# Patient Record
Sex: MALE | Race: WHITE | Employment: FULL TIME | URBAN - METROPOLITAN AREA
[De-identification: names, ages, dates, MRNs, and addresses within clinical notes are randomized per-mention and may not be internally consistent; named-entity substitution may affect disease eponyms.]

---

## 2017-07-27 ENCOUNTER — HOSPITAL ENCOUNTER (EMERGENCY)
Age: 52
Discharge: HOME | End: 2017-07-27
Attending: EMERGENCY MEDICINE
Payer: COMMERCIAL

## 2017-07-27 ENCOUNTER — APPOINTMENT (OUTPATIENT)
Dept: CT IMAGING | Age: 52
End: 2017-07-27
Attending: EMERGENCY MEDICINE
Payer: COMMERCIAL

## 2017-07-27 VITALS
TEMPERATURE: 97.8 F | HEIGHT: 73 IN | DIASTOLIC BLOOD PRESSURE: 74 MMHG | SYSTOLIC BLOOD PRESSURE: 136 MMHG | OXYGEN SATURATION: 98 % | BODY MASS INDEX: 30.48 KG/M2 | WEIGHT: 230 LBS | RESPIRATION RATE: 19 BRPM | HEART RATE: 54 BPM

## 2017-07-27 DIAGNOSIS — R42 VERTIGO: Primary | ICD-10-CM

## 2017-07-27 LAB
ALBUMIN SERPL BCP-MCNC: 3.6 G/DL (ref 3.5–5)
ALBUMIN/GLOB SERPL: 1.2 {RATIO} (ref 1.1–2.2)
ALP SERPL-CCNC: 82 U/L (ref 45–117)
ALT SERPL-CCNC: 33 U/L (ref 12–78)
ANION GAP BLD CALC-SCNC: 9 MMOL/L (ref 5–15)
APPEARANCE UR: CLEAR
AST SERPL W P-5'-P-CCNC: 23 U/L (ref 15–37)
ATRIAL RATE: 57 BPM
BASOPHILS # BLD AUTO: 0 K/UL (ref 0–0.1)
BASOPHILS # BLD: 1 % (ref 0–1)
BILIRUB DIRECT SERPL-MCNC: <0.1 MG/DL (ref 0–0.2)
BILIRUB SERPL-MCNC: 0.3 MG/DL (ref 0.2–1)
BILIRUB UR QL: NEGATIVE
BUN SERPL-MCNC: 14 MG/DL (ref 6–20)
BUN/CREAT SERPL: 13 (ref 12–20)
CALCIUM SERPL-MCNC: 8.3 MG/DL (ref 8.5–10.1)
CALCULATED P AXIS, ECG09: 49 DEGREES
CALCULATED R AXIS, ECG10: 59 DEGREES
CALCULATED T AXIS, ECG11: 51 DEGREES
CHLORIDE SERPL-SCNC: 107 MMOL/L (ref 97–108)
CK SERPL-CCNC: 222 U/L (ref 39–308)
CO2 SERPL-SCNC: 26 MMOL/L (ref 21–32)
COLOR UR: NORMAL
CREAT SERPL-MCNC: 1.08 MG/DL (ref 0.7–1.3)
DIAGNOSIS, 93000: NORMAL
EOSINOPHIL # BLD: 0.3 K/UL (ref 0–0.4)
EOSINOPHIL NFR BLD: 5 % (ref 0–7)
ERYTHROCYTE [DISTWIDTH] IN BLOOD BY AUTOMATED COUNT: 13.5 % (ref 11.5–14.5)
GLOBULIN SER CALC-MCNC: 3 G/DL (ref 2–4)
GLUCOSE SERPL-MCNC: 100 MG/DL (ref 65–100)
GLUCOSE UR STRIP.AUTO-MCNC: NEGATIVE MG/DL
HCT VFR BLD AUTO: 43 % (ref 36.6–50.3)
HGB BLD-MCNC: 14.4 G/DL (ref 12.1–17)
HGB UR QL STRIP: NEGATIVE
KETONES UR QL STRIP.AUTO: NEGATIVE MG/DL
LEUKOCYTE ESTERASE UR QL STRIP.AUTO: NEGATIVE
LYMPHOCYTES # BLD AUTO: 39 % (ref 12–49)
LYMPHOCYTES # BLD: 2.1 K/UL (ref 0.8–3.5)
MAGNESIUM SERPL-MCNC: 2 MG/DL (ref 1.6–2.4)
MCH RBC QN AUTO: 29.9 PG (ref 26–34)
MCHC RBC AUTO-ENTMCNC: 33.5 G/DL (ref 30–36.5)
MCV RBC AUTO: 89.2 FL (ref 80–99)
MONOCYTES # BLD: 0.4 K/UL (ref 0–1)
MONOCYTES NFR BLD AUTO: 7 % (ref 5–13)
NEUTS SEG # BLD: 2.7 K/UL (ref 1.8–8)
NEUTS SEG NFR BLD AUTO: 48 % (ref 32–75)
NITRITE UR QL STRIP.AUTO: NEGATIVE
P-R INTERVAL, ECG05: 172 MS
PH UR STRIP: 6.5 [PH] (ref 5–8)
PLATELET # BLD AUTO: 199 K/UL (ref 150–400)
POTASSIUM SERPL-SCNC: 3.6 MMOL/L (ref 3.5–5.1)
PROT SERPL-MCNC: 6.6 G/DL (ref 6.4–8.2)
PROT UR STRIP-MCNC: NEGATIVE MG/DL
Q-T INTERVAL, ECG07: 426 MS
QRS DURATION, ECG06: 98 MS
QTC CALCULATION (BEZET), ECG08: 414 MS
RBC # BLD AUTO: 4.82 M/UL (ref 4.1–5.7)
SODIUM SERPL-SCNC: 142 MMOL/L (ref 136–145)
SP GR UR REFRACTOMETRY: 1.02 (ref 1–1.03)
TROPONIN I SERPL-MCNC: <0.04 NG/ML
UROBILINOGEN UR QL STRIP.AUTO: 0.2 EU/DL (ref 0.2–1)
VENTRICULAR RATE, ECG03: 57 BPM
WBC # BLD AUTO: 5.6 K/UL (ref 4.1–11.1)

## 2017-07-27 PROCEDURE — 36415 COLL VENOUS BLD VENIPUNCTURE: CPT | Performed by: EMERGENCY MEDICINE

## 2017-07-27 PROCEDURE — 96374 THER/PROPH/DIAG INJ IV PUSH: CPT

## 2017-07-27 PROCEDURE — 83735 ASSAY OF MAGNESIUM: CPT | Performed by: EMERGENCY MEDICINE

## 2017-07-27 PROCEDURE — 96361 HYDRATE IV INFUSION ADD-ON: CPT

## 2017-07-27 PROCEDURE — 93005 ELECTROCARDIOGRAM TRACING: CPT

## 2017-07-27 PROCEDURE — 84484 ASSAY OF TROPONIN QUANT: CPT | Performed by: EMERGENCY MEDICINE

## 2017-07-27 PROCEDURE — 82550 ASSAY OF CK (CPK): CPT | Performed by: EMERGENCY MEDICINE

## 2017-07-27 PROCEDURE — 94762 N-INVAS EAR/PLS OXIMTRY CONT: CPT

## 2017-07-27 PROCEDURE — 80076 HEPATIC FUNCTION PANEL: CPT | Performed by: EMERGENCY MEDICINE

## 2017-07-27 PROCEDURE — 99285 EMERGENCY DEPT VISIT HI MDM: CPT

## 2017-07-27 PROCEDURE — 81003 URINALYSIS AUTO W/O SCOPE: CPT | Performed by: EMERGENCY MEDICINE

## 2017-07-27 PROCEDURE — 74011250636 HC RX REV CODE- 250/636: Performed by: EMERGENCY MEDICINE

## 2017-07-27 PROCEDURE — 70450 CT HEAD/BRAIN W/O DYE: CPT

## 2017-07-27 PROCEDURE — 80048 BASIC METABOLIC PNL TOTAL CA: CPT | Performed by: EMERGENCY MEDICINE

## 2017-07-27 PROCEDURE — 85025 COMPLETE CBC W/AUTO DIFF WBC: CPT | Performed by: EMERGENCY MEDICINE

## 2017-07-27 RX ORDER — MECLIZINE HYDROCHLORIDE 25 MG/1
25 TABLET ORAL
Qty: 20 TAB | Refills: 0 | Status: SHIPPED | OUTPATIENT
Start: 2017-07-27 | End: 2020-07-21 | Stop reason: SDUPTHER

## 2017-07-27 RX ORDER — ONDANSETRON 2 MG/ML
4 INJECTION INTRAMUSCULAR; INTRAVENOUS
Status: COMPLETED | OUTPATIENT
Start: 2017-07-27 | End: 2017-07-27

## 2017-07-27 RX ORDER — MECLIZINE HYDROCHLORIDE 25 MG/1
25 TABLET ORAL
Status: COMPLETED | OUTPATIENT
Start: 2017-07-27 | End: 2017-07-27

## 2017-07-27 RX ADMIN — SODIUM CHLORIDE 1000 ML: 900 INJECTION, SOLUTION INTRAVENOUS at 15:06

## 2017-07-27 RX ADMIN — ONDANSETRON 4 MG: 2 INJECTION INTRAMUSCULAR; INTRAVENOUS at 15:06

## 2017-07-27 RX ADMIN — MECLIZINE HYDROCHLORIDE 25 MG: 25 TABLET ORAL at 15:06

## 2017-07-27 NOTE — ED TRIAGE NOTES
Pt c/o dizziness x 1 month with fatigue. Pt verbalizes that it is worsening. Denies pain. Denies SOB or fevers. +nausea.

## 2017-07-27 NOTE — ED PROVIDER NOTES
HPI Comments: 49 y.o. male with past medical history significant for HTN and PVCs who presents from home with chief complaint of dizziness. Pt complains of dizziness with associated fatigue and nausea intermittently for the past few weeks with worsening sxs today, prompting pt to come to [facility] for further evaluation. Pt complains of dizziness worse when he moves his head laterally. Pt describes feeling \"whoozy\" and nauseated sometimes but denies vomiting or diarrhea. 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 Pt reports adequate hydration. Pt denies any regular medications. There are no other acute medical concerns at this time. Social hx: No tobacco use; Occasional EtOH. PCP: Colby Mckeon MD    Note written by Tania Faria, as dictated by Marielena De MD 2:40 PM      The history is provided by the patient. No  was used. Past Medical History:   Diagnosis Date    Chest pain, unspecified 6/17/2012    Hypertension     PVC's (premature ventricular contractions) 6/17/2012       Past Surgical History:   Procedure Laterality Date    HX ORTHOPAEDIC           No family history on file. Social History     Social History    Marital status:      Spouse name: N/A    Number of children: N/A    Years of education: N/A     Occupational History    Not on file. Social History Main Topics    Smoking status: Never Smoker    Smokeless tobacco: Never Used    Alcohol use Yes      Comment: Occasionally    Drug use: Not on file    Sexual activity: Yes     Other Topics Concern    Not on file     Social History Narrative         ALLERGIES: Doxycycline    Review of Systems   Constitutional: Positive for fatigue. Negative for activity change and fever. Eyes: Negative for pain. Respiratory: Negative for cough and shortness of breath.     Cardiovascular: Negative for chest pain and leg swelling. Gastrointestinal: Positive for nausea. Negative for abdominal pain, diarrhea and vomiting. Genitourinary: Negative for flank pain and hematuria. Musculoskeletal: Negative for gait problem, neck pain and neck stiffness. Skin: Negative for color change. Neurological: Positive for dizziness. Negative for speech difficulty and headaches. Hematological: Does not bruise/bleed easily. Psychiatric/Behavioral: Negative for confusion. All other systems reviewed and are negative. Vitals:    07/27/17 1341   BP: 149/85   Pulse: 62   Resp: 16   Temp: 97.8 °F (36.6 °C)   SpO2: 97%   Weight: 104.3 kg (230 lb)   Height: 6' 1\" (1.854 m)            Physical Exam   Constitutional: He is oriented to person, place, and time. He appears well-developed and well-nourished. No distress. HENT:   Head: Normocephalic and atraumatic. Right Ear: External ear normal.   Left Ear: External ear normal.   Eyes: EOM are normal. Pupils are equal, round, and reactive to light. Neck: Normal range of motion. Neck supple. No JVD present. No tracheal deviation present. Cardiovascular: Normal rate, regular rhythm and normal heart sounds. Exam reveals no gallop and no friction rub. No murmur heard. Pulmonary/Chest: Effort normal and breath sounds normal. No stridor. No respiratory distress. He has no wheezes. He has no rales. Abdominal: Soft. Bowel sounds are normal. He exhibits no distension. There is no tenderness. There is no rebound and no guarding. Musculoskeletal: Normal range of motion. He exhibits no edema or tenderness. Neurological: He is alert and oriented to person, place, and time. He has normal reflexes. No cranial nerve deficit. Coordination normal.   He has normal sensation in face and all extremities. He has no facial droop. 5/5 hand  strength, biceps and triceps strength bilaterally. 5/5 plantar/dorsiflexion and extension strength bilaterally. Skin: Skin is warm and dry.  No rash noted. He is not diaphoretic. No erythema. Psychiatric: He has a normal mood and affect. His behavior is normal. Judgment and thought content normal.   Nursing note and vitals reviewed. Note written by Ana Hill, as dictated by Talisha Constantino MD 3:26 PM        MDM  Number of Diagnoses or Management Options  Diagnosis management comments: 60-year-old white male presents to the emergency department with dizziness and vertigo. Patient has been working and training outside. Patient to be dehydrated versus peripheral vertigo. We'll also check a head CT, blood work, urinalysis. We'll give him meclizine and IV fluids and Zofran and reassess. Patient agrees with this plan       Amount and/or Complexity of Data Reviewed  Clinical lab tests: ordered and reviewed  Tests in the radiology section of CPT®: ordered and reviewed  Tests in the medicine section of CPT®: ordered and reviewed  Decide to obtain previous medical records or to obtain history from someone other than the patient: yes  Obtain history from someone other than the patient: yes  Review and summarize past medical records: yes  Independent visualization of images, tracings, or specimens: yes    Risk of Complications, Morbidity, and/or Mortality  Presenting problems: moderate  Diagnostic procedures: moderate  Management options: moderate    Patient Progress  Patient progress: improved    ED Course       Procedures  PROGRESS NOTE:  4:19 PM  Head CT, blood work, and UA are all normal. Will re-evaluate pt and see how he is feeling. Pt is improve. He'd like to go home. Will add Meclizine. Good return precautions given to patient. Close follow up with PCP recommended. Patient and/or family voices understanding of this plan. Discharge instructions were explained by me and all concerns were addressed.

## 2017-07-27 NOTE — ED NOTES
Patient c/o dizziness x1 month and today was worse. Today he works on heavy [employer] in the heat. Denies nausea and vomiting.

## 2017-07-27 NOTE — DISCHARGE INSTRUCTIONS
We hope that we have addressed all of your medical concerns. The examination and treatment you received in the Emergency Department were for an emergent problem and were not intended as complete care. It is important that you follow up with your healthcare provider(s) for ongoing care. If your symptoms worsen or do not improve as expected, and you are unable to reach your usual health care provider(s), you should return to the Emergency Department. Today's healthcare is undergoing tremendous change, and patient satisfaction surveys are one of the many tools to assess the quality of medical care. You may receive a survey from the [employer] regarding your experience in the Emergency Department. I hope that your experience has been completely positive, particularly the medical care that I provided. As such, please participate in the survey; anything less than excellent does not meet my expectations or intentions. [facility] and [facility] participate in nationally recognized quality of care measures. If your blood pressure is greater than 120/80, as reported below, we urge that you seek medical care to address the potential of high blood pressure, commonly known as hypertension. Hypertension can be hereditary or can be caused by certain medical conditions, pain, stress, or \"white coat syndrome. \"       Please make an appointment with your health care provider(s) for follow up of your Emergency Department visit. VITALS:   Patient Vitals for the past 8 hrs:   Temp Pulse Resp BP SpO2   07/27/17 1630 - (!) 49 18 136/74 96 %   07/27/17 1615 - (!) 49 19 141/85 99 %   07/27/17 1600 - (!) 51 19 146/87 97 %   07/27/17 1545 - (!) 53 13 148/84 97 %   07/27/17 1530 - (!) 51 17 140/80 -   07/27/17 1515 - (!) 53 20 139/81 -   07/27/17 1341 97.8 °F (36.6 °C) 62 16 149/85 97 %          Thank you for allowing us to provide you with medical care today. We realize that you have many choices for your emergency care needs. Please choose us in the future for any continued health care needs. Mitchel Travis MD    [address] [facility].   Office: [phone]            Recent Results (from the past 24 hour(s))   CBC WITH AUTOMATED DIFF    Collection Time: 07/27/17  2:00 PM   Result Value Ref Range    WBC 5.6 4.1 - 11.1 K/uL    RBC 4.82 4.10 - 5.70 M/uL    HGB 14.4 12.1 - 17.0 g/dL    HCT 43.0 36.6 - 50.3 %    MCV 89.2 80.0 - 99.0 FL    MCH 29.9 26.0 - 34.0 PG    MCHC 33.5 30.0 - 36.5 g/dL    RDW 13.5 11.5 - 14.5 %    PLATELET 161 224 - 728 K/uL    NEUTROPHILS 48 32 - 75 %    LYMPHOCYTES 39 12 - 49 %    MONOCYTES 7 5 - 13 %    EOSINOPHILS 5 0 - 7 %    BASOPHILS 1 0 - 1 %    ABS. NEUTROPHILS 2.7 1.8 - 8.0 K/UL    ABS. LYMPHOCYTES 2.1 0.8 - 3.5 K/UL    ABS. MONOCYTES 0.4 0.0 - 1.0 K/UL    ABS. EOSINOPHILS 0.3 0.0 - 0.4 K/UL    ABS.  BASOPHILS 0.0 0.0 - 0.1 K/UL   METABOLIC PANEL, BASIC    Collection Time: 07/27/17  2:00 PM   Result Value Ref Range    Sodium 142 136 - 145 mmol/L    Potassium 3.6 3.5 - 5.1 mmol/L    Chloride 107 97 - 108 mmol/L    CO2 26 21 - 32 mmol/L    Anion gap 9 5 - 15 mmol/L    Glucose 100 65 - 100 mg/dL    BUN 14 6 - 20 MG/DL    Creatinine 1.08 0.70 - 1.30 MG/DL    BUN/Creatinine ratio 13 12 - 20      GFR est AA >60 >60 ml/min/1.73m2    GFR est non-AA >60 >60 ml/min/1.73m2    Calcium 8.3 (L) 8.5 - 10.1 MG/DL   CK W/ REFLX CKMB    Collection Time: 07/27/17  2:00 PM   Result Value Ref Range     39 - 308 U/L   TROPONIN I    Collection Time: 07/27/17  2:00 PM   Result Value Ref Range    Troponin-I, Qt. <0.04 <0.05 ng/mL   MAGNESIUM    Collection Time: 07/27/17  2:00 PM   Result Value Ref Range    Magnesium 2.0 1.6 - 2.4 mg/dL   HEPATIC FUNCTION PANEL    Collection Time: 07/27/17  2:00 PM   Result Value Ref Range    Protein, total 6.6 6.4 - 8.2 g/dL    Albumin 3.6 3.5 - 5.0 g/dL    Globulin 3.0 2.0 - 4.0 g/dL A-G Ratio 1.2 1.1 - 2.2      Bilirubin, total 0.3 0.2 - 1.0 MG/DL    Bilirubin, direct <0.1 0.0 - 0.2 MG/DL    Alk. phosphatase 82 45 - 117 U/L    AST (SGOT) 23 15 - 37 U/L    ALT (SGPT) 33 12 - 78 U/L   URINALYSIS W/ RFLX MICROSCOPIC    Collection Time: 07/27/17  3:08 PM   Result Value Ref Range    Color YELLOW/STRAW      Appearance CLEAR CLEAR      Specific gravity 1.022 1.003 - 1.030      pH (UA) 6.5 5.0 - 8.0      Protein NEGATIVE  NEG mg/dL    Glucose NEGATIVE  NEG mg/dL    Ketone NEGATIVE  NEG mg/dL    Bilirubin NEGATIVE  NEG      Blood NEGATIVE  NEG      Urobilinogen 0.2 0.2 - 1.0 EU/dL    Nitrites NEGATIVE  NEG      Leukocyte Esterase NEGATIVE  NEG         Ct Head Wo Cont    Result Date: 7/27/2017  INDICATION: Vertigo, episodic, peripheral; vertigo, dizziness, not feeling well, eval for intracranial process. Dizziness for one month. Exam: Noncontrast CT of the brain is performed with 5 mm collimation. CT dose reduction was achieved with the use of the standardized protocol tailored for this examination and automatic exposure control for dose modulation. FINDINGS: There is no acute intracranial hemorrhage, mass, mass effect or herniation. Ventricular system is normal. The gray-white matter differentiation is well-preserved. The mastoid air cells are well pneumatized. The visualized paranasal sinuses are normal. IMPRESSION: No acute intracranial hemorrhage, mass or infarct. Dizziness: Care Instructions  Your Care Instructions  Dizziness is the feeling of unsteadiness or fuzziness in your head. It is different than having vertigo, which is a feeling that the room is spinning or that you are moving or falling. It is also different from lightheadedness, which is the feeling that you are about to faint. It can be hard to know what causes dizziness. Some people feel dizzy when they have migraine headaches. Sometimes bouts of flu can make you feel dizzy.  Some medical conditions, such as heart problems or high blood pressure, can make you feel dizzy. Many medicines can cause dizziness, including medicines for high blood pressure, pain, or anxiety. If a medicine causes your symptoms, your doctor may recommend that you stop or change the medicine. If it is a problem with your heart, you may need medicine to help your heart work better. If there is no clear reason for your symptoms, your doctor may suggest watching and waiting for a while to see if the dizziness goes away on its own. Follow-up care is a key part of your treatment and safety. Be sure to make and go to all appointments, and call your doctor if you are having problems. It's also a good idea to know your test results and keep a list of the medicines you take. How can you care for yourself at home? · If your doctor recommends or prescribes medicine, take it exactly as directed. Call your doctor if you think you are having a problem with your medicine. · Do not drive while you feel dizzy. · Try to prevent falls. Steps you can take include:  ¨ Using nonskid mats, adding grab bars near the tub, and using night-lights. ¨ Clearing your home so that walkways are free of anything you might trip on. ¨ Letting family and friends know that you have been feeling dizzy. This will help them know how to help you. When should you call for help? Call 911 anytime you think you may need emergency care. For example, call if:  · You passed out (lost consciousness). · You have dizziness along with symptoms of a heart attack. These may include:  ¨ Chest pain or pressure, or a strange feeling in the chest.  ¨ Sweating. ¨ Shortness of breath. ¨ Nausea or vomiting. ¨ Pain, pressure, or a strange feeling in the back, neck, jaw, or upper belly or in one or both shoulders or arms. ¨ Lightheadedness or sudden weakness. ¨ A fast or irregular heartbeat. · You have symptoms of a stroke.  These may include:  ¨ Sudden numbness, tingling, weakness, or loss of movement in your face, arm, or leg, especially on only one side of your body. ¨ Sudden vision changes. ¨ Sudden trouble speaking. ¨ Sudden confusion or trouble understanding simple statements. ¨ Sudden problems with walking or balance. ¨ A sudden, severe headache that is different from past headaches. Call your doctor now or seek immediate medical care if:  · You feel dizzy and have a fever, headache, or ringing in your ears. · You have new or increased nausea and vomiting. · Your dizziness does not go away or comes back. Watch closely for changes in your health, and be sure to contact your doctor if:  · You do not get better as expected. Where can you learn more? Go to http://ellaDynamicOpskristian.info/. Enter R198 in the search box to learn more about \"Dizziness: Care Instructions. \"  Current as of: March 20, 2017  Content Version: 11.3  © 4670-9601 [employer]. Care instructions adapted under license by [employer] (which disclaims liability or warranty for this information). If you have questions about a medical condition or this instruction, always ask your healthcare professional. [place] [address] any warranty or liability for your use of this information. Vertigo: Care Instructions  Your Care Instructions  Vertigo is the feeling that you or your surroundings are moving when there is no actual movement. It is often described as a feeling of spinning, whirling, falling, or tilting. Vertigo may make you vomit or feel nauseated. You may have trouble standing or walking and may lose your balance. Vertigo is often related to an inner ear problem, but it can have other more serious causes. If vertigo continues, you may need more tests to find its cause. Follow-up care is a key part of your treatment and safety. Be sure to make and go to all appointments, and call your doctor if you are having problems.  Its also a good idea to know your test results and keep a list of the medicines you take. How can you care for yourself at home? · Do not lie flat on your back. Prop yourself up slightly. This may reduce the spinning feeling. Keep your eyes open. · Move slowly so that you do not fall. · If your doctor recommends medicine, take it exactly as directed. · Do not drive while you are having vertigo. Certain exercises, called Nettles-Daroff exercises, can help decrease vertigo. To do Nettles-Daroff exercises:  · Sit on the edge of a bed or sofa and quickly lie down on the side that causes the worst vertigo. Lie on your side with your ear down. · Stay in this position for at least 30 seconds or until the vertigo goes away. · Sit up. If this causes vertigo, wait for it to stop. · Repeat the procedure on the other side. · Repeat this 10 times. Do these exercises 2 times a day until the vertigo is gone. When should you call for help? Call 911 anytime you think you may need emergency care. For example, call if:  · You passed out (lost consciousness). · You have symptoms of a stroke. These may include:  ¨ Sudden numbness, tingling, weakness, or loss of movement in your face, arm, or leg, especially on only one side of your body. ¨ Sudden vision changes. ¨ Sudden trouble speaking. ¨ Sudden confusion or trouble understanding simple statements. ¨ Sudden problems with walking or balance. ¨ A sudden, severe headache that is different from past headaches. Call your doctor now or seek immediate medical care if:  · Vertigo occurs with a fever, a headache, or ringing in your ears. · You have new or increased nausea and vomiting. Watch closely for changes in your health, and be sure to contact your doctor if:  · Vertigo gets worse or happens more often. · Vertigo has not gotten better after 2 weeks. Where can you learn more? Go to http://ella-kristian.info/.   Enter M629 in the search box to learn more about \"Vertigo: Care Instructions. \"  Current as of: July 29, 2016  Content Version: 11.3  © 5857-0260 [employer], [facility]. Care instructions adapted under license by [employer] (which disclaims liability or warranty for this information). If you have questions about a medical condition or this instruction, always ask your healthcare professional. [place] [address] any warranty or liability for your use of this information.

## 2017-08-08 ENCOUNTER — OFFICE VISIT (OUTPATIENT)
Dept: FAMILY MEDICINE CLINIC | Age: 52
End: 2017-08-08

## 2017-08-08 VITALS
DIASTOLIC BLOOD PRESSURE: 82 MMHG | SYSTOLIC BLOOD PRESSURE: 127 MMHG | TEMPERATURE: 98.2 F | RESPIRATION RATE: 16 BRPM | HEIGHT: 73 IN | OXYGEN SATURATION: 96 % | WEIGHT: 234 LBS | HEART RATE: 86 BPM | BODY MASS INDEX: 31.01 KG/M2

## 2017-08-08 DIAGNOSIS — M79.671 PAIN OF RIGHT HEEL: ICD-10-CM

## 2017-08-08 DIAGNOSIS — R42 DIZZINESS: Primary | ICD-10-CM

## 2017-08-08 DIAGNOSIS — R53.83 FATIGUE, UNSPECIFIED TYPE: ICD-10-CM

## 2017-08-08 RX ORDER — NAPROXEN 500 MG/1
500 TABLET ORAL 2 TIMES DAILY WITH MEALS
Qty: 28 TAB | Refills: 0 | Status: SHIPPED | OUTPATIENT
Start: 2017-08-08 | End: 2017-08-22

## 2017-08-08 NOTE — PROGRESS NOTES
Reviewed record in preparation for visit and have necessary documentation  Opportunity was given for questions  Goals that were addressed and/or need to be completed after this appointment include   Health Maintenance Due   Topic Date Due    DTaP/Tdap/Td series (1 - Tdap) 08/16/1987    FOBT Q 1 YEAR AGE 50-75  08/16/2016    INFLUENZA AGE 9 TO ADULT  08/01/2017     Orthostatic BP  Laying 144/79 pulse 75  Sitting 135/82 pulse 69  Standing 127/82 pulse 86

## 2017-08-08 NOTE — PATIENT INSTRUCTIONS
Plantar Fasciitis: Exercises  Your Care Instructions  Here are some examples of typical rehabilitation exercises for your condition. Start each exercise slowly. Ease off the exercise if you start to have pain. Your doctor or physical therapist will tell you when you can start these exercises and which ones will work best for you. How to do the exercises  Note: Each exercise should create a pulling feeling but should not cause pain. Towel stretch    1. Sit with your legs extended and knees straight. 2. Place a towel around your foot just under the toes. 3. Hold each end of the towel in each hand, with your hands above your knees. 4. Pull back with the towel so that your foot stretches toward you. 5. Hold the position for at least 15 to 30 seconds. 6. Repeat 2 to 4 times a session, up to 5 sessions a day. Calf stretch    Note: This exercise stretches the muscles at the back of the lower leg (the calf) and the Achilles tendon. Do this exercise 3 or 4 times a day, 5 days a week. 1. Stand facing a wall with your hands on the wall at about eye level. Put the leg you want to stretch about a step behind your other leg. 2. Keeping your back heel on the floor, bend your front knee until you feel a stretch in the back leg. 3. Hold the stretch for 15 to 30 seconds. Repeat 2 to 4 times. Plantar fascia and calf stretch    Note: Stretching the plantar fascia and calf muscles can increase flexibility and decrease heel pain. You can do this exercise several times each day and before and after activity. 1. Stand on a step as shown above. Be sure to hold on to the banister. 2. Slowly let your heels down over the edge of the step as you relax your calf muscles. You should feel a gentle stretch across the bottom of your foot and up the back of your leg to your knee. 3. Hold the stretch about 15 to 30 seconds, and then tighten your calf muscle a little to bring your heel back up to the level of the step.  Repeat 2 to 4 times. Towel curls    1. While sitting, place your foot on a towel on the floor and scrunch the towel toward you with your toes. 2. Then, also using your toes, push the towel away from you. Note: Make this exercise more challenging by placing a weighted object, such as a soup can, on the other end of the towel. [place] pickups    1. Put marbles on the floor next to a cup.  2. Using your toes, try to lift the marbles up from the floor and put them in the cup. Follow-up care is a key part of your treatment and safety. Be sure to make and go to all appointments, and call your doctor if you are having problems. It's also a good idea to know your test results and keep a list of the medicines you take. Where can you learn more? Go to http://ella-kristian.info/. Harjit Miles in the search box to learn more about \"Plantar Fasciitis: Exercises. \"  Current as of: March 21, 2017  Content Version: 11.3  © 9220-8428 [employer]. Care instructions adapted under license by [employer] (which disclaims liability or warranty for this information). If you have questions about a medical condition or this instruction, always ask your healthcare professional. [address] any warranty or liability for your use of this information.

## 2017-08-08 NOTE — MR AVS SNAPSHOT
Visit Information Date & Time Provider Department Dept. Phone Encounter #  
 8/8/2017 10:30 AM Liam Tang MD Danilo White [id number] Follow-up Instructions Return in about 4 weeks (around 9/5/2017), or if symptoms worsen or fail to improve. Upcoming Health Maintenance Date Due DTaP/Tdap/Td series (1 - Tdap) 8/16/1987 FOBT Q 1 YEAR AGE 50-75 8/16/2016 INFLUENZA AGE 9 TO ADULT 8/1/2017 Allergies as of 8/8/2017  Review Complete On: 8/8/2017 By: Liam Tang MD  
  
 Severity Noted Reaction Type Reactions Doxycycline Medium 07/15/2011    Hives Current Immunizations  Reviewed on 10/5/2011 No immunizations on file. Not reviewed this visit You Were Diagnosed With   
  
 Codes Comments Dizziness    -  Primary ICD-10-CM: S49 ICD-9-CM: 780.4 Fatigue, unspecified type     ICD-10-CM: R53.83 ICD-9-CM: 780.79 Pain of left heel     ICD-10-CM: Q46.733 ICD-9-CM: 729.5 Vitals BP Pulse Temp Resp Height(growth percentile) Weight(growth percentile) 127/82 (BP 1 Location: Left arm, BP Patient Position: Standing) 86 98.2 °F (36.8 °C) (Oral) 16 6' 1\" (1.854 m) 234 lb (106.1 kg) SpO2 BMI Smoking Status 96% 30.87 kg/m2 Never Smoker Vitals History BMI and BSA Data Body Mass Index Body Surface Area  
 30.87 kg/m 2 2.34 m 2 Preferred Pharmacy Pharmacy Name Phone [address] [place], [place] - [address] [phone] Your Updated Medication List  
  
   
This list is accurate as of: 8/8/17 10:45 AM.  Always use your most recent med list.  
  
  
  
  
 lactulose 10 gram/15 mL solution Commonly known as:  Adron Query Take 30 mL by mouth three (3) times daily. meclizine 25 mg tablet Commonly known as:  ANTIVERT Take 1 Tab by mouth three (3) times daily as needed. naproxen 500 mg tablet Commonly known as:  NAPROSYN Take 1 Tab by mouth two (2) times daily (with meals) for 14 days. promethazine-codeine 6.25-10 mg/5 mL syrup Commonly known as:  PHENERGAN with CODEINE Take 5 mL by mouth four (4) times daily as needed for Cough. Max Daily Amount: 20 mL. Prescriptions Sent to Pharmacy Refills  
 naproxen (NAPROSYN) 500 mg tablet 0 Sig: Take 1 Tab by mouth two (2) times daily (with meals) for 14 days. Class: Normal  
 Pharmacy: [facility] #: [phone] Route: Oral  
  
We Performed the Following HGB & HCT [20934 CPT(R)] METABOLIC PANEL, COMPREHENSIVE [22444 CPT(R)] TSH 3RD GENERATION [30346 CPT(R)] VITAMIN D, 25 HYDROXY [id number] CPT(R)] Follow-up Instructions Return in about 4 weeks (around 9/5/2017), or if symptoms worsen or fail to improve. Patient Instructions Plantar Fasciitis: Exercises Your Care Instructions Here are some examples of typical rehabilitation exercises for your condition. Start each exercise slowly. Ease off the exercise if you start to have pain. Your doctor or physical therapist will tell you when you can start these exercises and which ones will work best for you. How to do the exercises Note: Each exercise should create a pulling feeling but should not cause pain. Towel stretch 1. Sit with your legs extended and knees straight. 2. Place a towel around your foot just under the toes. 3. Hold each end of the towel in each hand, with your hands above your knees. 4. Pull back with the towel so that your foot stretches toward you. 5. Hold the position for at least 15 to 30 seconds. 6. Repeat 2 to 4 times a session, up to 5 sessions a day. Calf stretch Note: This exercise stretches the muscles at the back of the lower leg (the calf) and the Achilles tendon. Do this exercise 3 or 4 times a day, 5 days a week. 1. Stand facing a wall with your hands on the wall at about eye level. Put the leg you want to stretch about a step behind your other leg. 2. Keeping your back heel on the floor, bend your front knee until you feel a stretch in the back leg. 3. Hold the stretch for 15 to 30 seconds. Repeat 2 to 4 times. Plantar fascia and calf stretch Note: Stretching the plantar fascia and calf muscles can increase flexibility and decrease heel pain. You can do this exercise several times each day and before and after activity. 1. Stand on a step as shown above. Be sure to hold on to the banister. 2. Slowly let your heels down over the edge of the step as you relax your calf muscles. You should feel a gentle stretch across the bottom of your foot and up the back of your leg to your knee. 3. Hold the stretch about 15 to 30 seconds, and then tighten your calf muscle a little to bring your heel back up to the level of the step. Repeat 2 to 4 times. Towel curls 1. While sitting, place your foot on a towel on the floor and scrunch the towel toward you with your toes. 2. Then, also using your toes, push the towel away from you. Note: Make this exercise more challenging by placing a weighted object, such as a soup can, on the other end of the towel. [place] pickups 1. Put marbles on the floor next to a cup. 
2. Using your toes, try to lift the marbles up from the floor and put them in the cup. Follow-up care is a key part of your treatment and safety. Be sure to make and go to all appointments, and call your doctor if you are having problems. It's also a good idea to know your test results and keep a list of the medicines you take. Where can you learn more? Go to http://ella-kristian.info/. Karen Lacy in the search box to learn more about \"Plantar Fasciitis: Exercises. \" Current as of: March 21, 2017 Content Version: 11.3 © 8893-9419 [employer].  Care instructions adapted under license by [address] (which disclaims liability or warranty for this information). If you have questions about a medical condition or this instruction, always ask your healthcare professional. Nandini Carter any warranty or liability for your use of this information. Introducing [facility]! Yamila Santizo introduces [employer] patient portal. Now you can access parts of your medical record, email your doctor's office, and request medication refills online. 1. In your internet browser, go to [email or web address] 2. Click on the First Time User? Click Here link in the Sign In box. You will see the New Member Sign Up page. 3. Enter your [employer] Access Code exactly as it appears below. You will not need to use this code after youve completed the sign-up process. If you do not sign up before the expiration date, you must request a new code. · [employer] Access Code: [email or web address] Expires: 10/25/2017  4:41 PM 
 
4. Enter the last four digits of your Social Security Number (xxxx) and Date of Birth (mm/dd/yyyy) as indicated and click Submit. You will be taken to the next sign-up page. 5. Create a H[employer]ters ID. This will be your [employer] login ID and cannot be changed, so think of one that is secure and easy to remember. 6. Create a [employer] password. You can change your password at any time. 7. Enter your Password Reset Question and Answer. This can be used at a later time if you forget your password. 8. Enter your e-mail address. You will receive e-mail notification when new information is available in [address] 9. Click Sign Up. You can now view and download portions of your medical record. 10. Click the Download Summary menu link to download a portable copy of your medical information. If you have questions, please visit the Frequently Asked Questions section of the [employer] website.  Remember, [employer] is NOT to be used for urgent needs. For medical emergencies, dial 911. Now available from your iPhone and Android! Please provide this summary of care documentation to your next provider. Your primary care clinician is listed as Phys Other. If you have any questions after today's visit, please call [phone].

## 2017-08-08 NOTE — PROGRESS NOTES
Progress Note    Patient: Mercy Loomis MRN: [id number]  SSN: [id number]    YOB: 1965  Age: 49 y.o. Sex: male        Chief Complaint   Patient presents with    Dizziness    Foot Pain     heel pain         Subjective:     Encounter Diagnoses   Name Primary?  Dizziness Yes    Fatigue, unspecified type     Pain of right heel        Dizziness  I personally reviewed and summarized the following from an [facility] ER visit on 7/27/17:  Patient present to ER complaining of dizziness x 1 month, intermittently. Associated with fatigue. Endorses significant time working in heat though the patient reports adequate hydration. CT Head w/out contrast showed no acute intracranial process. He was discharged with meclizine. He reports that meclizine is not helping with dizziness. Describes dizziness as lightheadedness \"almost off-kilter. \" worsens with movement, moving from sitting to standing or turning in bed. No head trauma. Endorses tinnitus intermittent not associated with dizziness. Patient also endorses fatigue and difficulty starting activity. Has noted that he has been working on weight loss but as not been successful despite diet and exercise. Heel pain  Left heel pain x 2 months. Worsening gradually. Pain is worse first thing in the morning upon walking. Has tried several OTC inserts. Current and past medical information:    Current Medications after this visit[de-identified]     Current Outpatient Prescriptions   Medication Sig    naproxen (NAPROSYN) 500 mg tablet Take 1 Tab by mouth two (2) times daily (with meals) for 14 days.  meclizine (ANTIVERT) 25 mg tablet Take 1 Tab by mouth three (3) times daily as needed.  lactulose (CHRONULAC) 10 gram/15 mL solution Take 30 mL by mouth three (3) times daily.  promethazine-codeine (PHENERGAN WITH CODEINE) 6.25-10 mg/5 mL syrup Take 5 mL by mouth four (4) times daily as needed for Cough. Max Daily Amount: 20 mL.      No current facility-administered medications for this visit. Patient Active Problem List    Diagnosis Date Noted    Chest pain, unspecified 06/17/2012    PVC's (premature ventricular contractions) 06/17/2012    HTN (hypertension) 04/12/2012    Poor eyesight 01/06/2012    Epididymitis 06/08/2011    Epididymitis 06/08/2011    Bruising 05/12/2011    Eczema 01/27/2011    Cough 01/07/2011    Congestion 01/07/2011       Past Medical History:   Diagnosis Date    Chest pain, unspecified 6/17/2012    Hypertension     PVC's (premature ventricular contractions) 6/17/2012       Allergies   Allergen Reactions    Doxycycline Hives       Past Surgical History:   Procedure Laterality Date    HX ORTHOPAEDIC         Social History     Social History    Marital status:      Spouse name: N/A    Number of children: N/A    Years of education: N/A     Social History Main Topics    Smoking status: Never Smoker    Smokeless tobacco: Never Used    Alcohol use Yes      Comment: Occasionally    Drug use: None    Sexual activity: Yes     Other Topics Concern    None     Social History Narrative       {Review of Systems   Constitutional: Positive for malaise/fatigue. Negative for chills, diaphoresis, fever and weight loss. Respiratory: Negative for cough. Cardiovascular: Negative for leg swelling. Musculoskeletal: Positive for myalgias. Negative for falls and joint pain. Skin: Negative for itching and rash. Neurological: Positive for dizziness. Negative for tingling, tremors, focal weakness, seizures and weakness. Psychiatric/Behavioral: Negative for depression. The patient does not have insomnia. Objective:     Vitals:    08/08/17 1015 08/08/17 1018 08/08/17 1020   BP: 144/79 135/82 127/82   Pulse: 75 69 86   Resp: 16     Temp: 98.2 °F (36.8 °C)     TempSrc: Oral     SpO2: 96%     Weight: 234 lb (106.1 kg)     Height: 6' 1\" (1.854 m)        Body mass index is 30.87 kg/(m^2).       Physical Exam Constitutional: He is oriented to person, place, and time. He appears well-developed and well-nourished. He appears distressed. HENT:   Head: Normocephalic and atraumatic. Right Ear: External ear normal.   Left Ear: External ear normal.   Mouth/Throat: Oropharynx is clear and moist. No oropharyngeal exudate. Eyes: Conjunctivae and EOM are normal. Pupils are equal, round, and reactive to light. Right eye exhibits no discharge. Left eye exhibits no discharge. Neck: Normal range of motion. Neck supple. No thyromegaly present. Cardiovascular: Normal rate and regular rhythm. Pulmonary/Chest: Effort normal and breath sounds normal.   Lymphadenopathy:     He has no cervical adenopathy. Neurological: He is alert and oriented to person, place, and time. He displays normal reflexes. No cranial nerve deficit. He exhibits normal muscle tone. Health Maintenance Due   Topic Date Due    DTaP/Tdap/Td series (1 - Tdap) 08/16/1987    FOBT Q 1 YEAR AGE 50-75  08/16/2016    INFLUENZA AGE 9 TO ADULT  08/01/2017       Assessment and orders:     Encounter Diagnoses     ICD-10-CM ICD-9-CM   1. Dizziness R42 780.4   2. Fatigue, unspecified type R53.83 780.79   3. Pain of right heel M79.671 729.5       1. Dizziness  2. Fatigue, unspecified type  Phu-Hallpike negative as well as orthostatics. - TSH 3RD GENERATION  - VITAMIN D, 25 HYDROXY  - HGB & HCT  - METABOLIC PANEL, COMPREHENSIVE    3. Pain of left heel  Plantar fasciitis. Provided anti-inflammatory and exercises. Also advised icing with stretching.    - naproxen (NAPROSYN) 500 mg tablet; Take 1 Tab by mouth two (2) times daily (with meals) for 14 days. Dispense: 28 Tab; Refill: 0        Plan of care:  Discussed diagnoses in detail with patient. Medication risks/benefits/side effects discussed with patient. All of the patient's questions were addressed. The patient understands and agrees with our plan of care.     The patient knows to call back if they are unsure of or forget any changes we discussed today or if the symptoms change. The patient received an After-Visit Summary which contains VS, orders, medication list and allergy list. This can be used as a \"mini-medical record\" should they have to seek medical care while out of town. Follow-up Disposition:  Return in about 4 weeks (around 9/5/2017), or if symptoms worsen or fail to improve. No future appointments.     Signed By: Leila Pinzon MD     August 8, 2017

## 2017-08-09 LAB
25(OH)D3+25(OH)D2 SERPL-MCNC: 29.2 NG/ML (ref 30–100)
ALBUMIN SERPL-MCNC: 4.4 G/DL (ref 3.5–5.5)
ALBUMIN/GLOB SERPL: 2.2 {RATIO} (ref 1.2–2.2)
ALP SERPL-CCNC: 73 IU/L (ref 39–117)
ALT SERPL-CCNC: 23 IU/L (ref 0–44)
AST SERPL-CCNC: 23 IU/L (ref 0–40)
BILIRUB SERPL-MCNC: 0.3 MG/DL (ref 0–1.2)
BUN SERPL-MCNC: 14 MG/DL (ref 6–24)
BUN/CREAT SERPL: 14 (ref 9–20)
CALCIUM SERPL-MCNC: 9.3 MG/DL (ref 8.7–10.2)
CHLORIDE SERPL-SCNC: 105 MMOL/L (ref 96–106)
CO2 SERPL-SCNC: 23 MMOL/L (ref 18–29)
CREAT SERPL-MCNC: 1.01 MG/DL (ref 0.76–1.27)
GLOBULIN SER CALC-MCNC: 2 G/DL (ref 1.5–4.5)
GLUCOSE SERPL-MCNC: 90 MG/DL (ref 65–99)
HCT VFR BLD AUTO: 45 % (ref 37.5–51)
HGB BLD-MCNC: 15.5 G/DL (ref 12.6–17.7)
POTASSIUM SERPL-SCNC: 4 MMOL/L (ref 3.5–5.2)
PROT SERPL-MCNC: 6.4 G/DL (ref 6–8.5)
SODIUM SERPL-SCNC: 144 MMOL/L (ref 134–144)
TSH SERPL DL<=0.005 MIU/L-ACNC: 3.17 UIU/ML (ref 0.45–4.5)

## 2017-08-10 ENCOUNTER — OFFICE VISIT (OUTPATIENT)
Dept: FAMILY MEDICINE CLINIC | Age: 52
End: 2017-08-10

## 2017-08-10 ENCOUNTER — TELEPHONE (OUTPATIENT)
Dept: FAMILY MEDICINE CLINIC | Age: 52
End: 2017-08-10

## 2017-08-10 VITALS
HEART RATE: 68 BPM | WEIGHT: 241 LBS | SYSTOLIC BLOOD PRESSURE: 140 MMHG | TEMPERATURE: 98.1 F | HEIGHT: 73 IN | OXYGEN SATURATION: 97 % | BODY MASS INDEX: 31.94 KG/M2 | DIASTOLIC BLOOD PRESSURE: 75 MMHG | RESPIRATION RATE: 18 BRPM

## 2017-08-10 DIAGNOSIS — T63.441A BEE STING, ACCIDENTAL OR UNINTENTIONAL, INITIAL ENCOUNTER: Primary | ICD-10-CM

## 2017-08-10 RX ORDER — PREDNISONE 20 MG/1
40 TABLET ORAL
Qty: 10 TAB | Refills: 0 | Status: SHIPPED | OUTPATIENT
Start: 2017-08-10 | End: 2017-08-15

## 2017-08-10 NOTE — PATIENT INSTRUCTIONS
Insect Stings and Bites: Care Instructions  Your Care Instructions  Stings and bites from bees, wasps, ants, and other insects often cause pain, swelling, redness, and itching. In some people, especially children, the redness and swelling may be worse. It may extend several inches beyond the affected area. But in most cases, stings and bites don't cause reactions all over the body. If you have had a reaction to an insect sting or bite, you are at risk for a reaction if you get stung or bitten again. Follow-up care is a key part of your treatment and safety. Be sure to make and go to all appointments, and call your doctor if you are having problems. It's also a good idea to know your test results and keep a list of the medicines you take. How can you care for yourself at home? · Do not scratch or rub the skin where the sting or bite occurred. · Put a cold pack or ice cube on the area. Put a thin cloth between the ice and your skin. For some people, a paste of baking soda mixed with a little water helps relieve pain and decrease the reaction. · Take an over-the-counter antihistamine, such as diphenhydramine (Benadryl) or loratadine (Claritin), to relieve swelling, redness, and itching. Calamine lotion or hydrocortisone cream may also help. Do not give antihistamines to your child unless you have checked with the doctor first.  · Be safe with medicines. If your doctor prescribed medicine for your allergy, take it exactly as prescribed. Call your doctor if you think you are having a problem with your medicine. You will get more details on the specific medicines your doctor prescribes. · Your doctor may prescribe a shot of epinephrine to carry with you in case you have a severe reaction. Learn how and when to give yourself the shot, and keep it with you at all times. Make sure it has not . · Go to the emergency room anytime you have a severe reaction.  Go even if you have given yourself epinephrine and are feeling better. Symptoms can come back. When should you call for help? Call 911 anytime you think you may need emergency care. For example, call if:  · You have symptoms of a severe allergic reaction. These may include:  ¨ Sudden raised, red areas (hives) all over your body. ¨ Swelling of the throat, mouth, lips, or tongue. ¨ Trouble breathing. ¨ Passing out (losing consciousness). Or you may feel very lightheaded or suddenly feel weak, confused, or restless. Call your doctor now or seek immediate medical care if:  · You have symptoms of an allergic reaction not right at the sting or bite, such as:  ¨ A rash or small area of hives (raised, red areas on the skin). ¨ Itching. ¨ Swelling. ¨ Belly pain, nausea, or vomiting. · You have a lot of swelling around the site (such as your entire arm or leg is swollen). · You have signs of infection, such as:  ¨ Increased pain, swelling, redness, or warmth around the sting. ¨ Red streaks leading from the area. ¨ Pus draining from the sting. ¨ A fever. Watch closely for changes in your health, and be sure to contact your doctor if:  · You do not get better as expected. Where can you learn more? Go to http://ella-kristian.info/. Enter P390 in the search box to learn more about \"Insect Stings and Bites: Care Instructions. \"  Current as of: March 20, 2017  Content Version: 11.3  © 6054-7127 [employer]. Care instructions adapted under license by [employer] (which disclaims liability or warranty for this information). If you have questions about a medical condition or this instruction, always ask your healthcare professional. [place] [address] any warranty or liability for your use of this information. Prednisone (predniSONE Intensol, Prednicot, Deltasone, Makayla) - (By mouth)   Why this medicine is used:   Treats many diseases and conditions, especially problems related to inflammation.   Contact a nurse or doctor right away if you have:  · Rapid weight gain; swelling in your hands, ankles, or feet  · Severe stomach pain, nausea, vomiting, or red or black stools  · Depression, unusual thoughts, feelings, or behaviors, trouble sleeping  · Fever, chills, cough, sore throat, and body aches  · Trouble seeing, eye pain     Common side effects:  · Increased appetite, weight gain  · Round, puffy face  · Muscle pain, weakness  © 2017 [facility] Information is for End User's use only and may not be sold, redistributed or otherwise used for commercial purposes.

## 2017-08-10 NOTE — TELEPHONE ENCOUNTER
Pt called stating he would like to speak with Dr. Thai Lehman nurse in regards to a bee sting on his arm and is having swelling. He did not want to schedule an appt, \"just wants to ask a few questions\".  Please advise [phone]

## 2017-08-10 NOTE — PROGRESS NOTES
Progress Note    Patient: Karan Childs MRN: [id number]  SSN: [id number]    YOB: 1965  Age: 49 y.o. Sex: male        Chief Complaint   Patient presents with    Bee sting     bilateral upper extremity swelling         Subjective:     Encounter Diagnoses   Name Primary?  Bee sting, accidental or unintentional, initial encounter Yes       Bee sting  Bee sting this morning at approx 10 AM in left medical arm. Reports that he feels that he is \"swelling up across the arms and chest.\" Denies an SOB, swelling in mouth and face. Denies any pruritis, or rash. No hx of bee sting allergies. Current and past medical information:    Current Medications after this visit[de-identified]     Current Outpatient Prescriptions   Medication Sig    predniSONE (DELTASONE) 20 mg tablet Take 2 Tabs by mouth daily (with breakfast) for 5 days. Indications: Bee    naproxen (NAPROSYN) 500 mg tablet Take 1 Tab by mouth two (2) times daily (with meals) for 14 days.  meclizine (ANTIVERT) 25 mg tablet Take 1 Tab by mouth three (3) times daily as needed.  lactulose (CHRONULAC) 10 gram/15 mL solution Take 30 mL by mouth three (3) times daily.  promethazine-codeine (PHENERGAN WITH CODEINE) 6.25-10 mg/5 mL syrup Take 5 mL by mouth four (4) times daily as needed for Cough. Max Daily Amount: 20 mL. No current facility-administered medications for this visit.         Patient Active Problem List    Diagnosis Date Noted    Chest pain, unspecified 06/17/2012    PVC's (premature ventricular contractions) 06/17/2012    HTN (hypertension) 04/12/2012    Poor eyesight 01/06/2012    Epididymitis 06/08/2011    Epididymitis 06/08/2011    Bruising 05/12/2011    Eczema 01/27/2011    Cough 01/07/2011    Congestion 01/07/2011       Past Medical History:   Diagnosis Date    Chest pain, unspecified 6/17/2012    Hypertension     PVC's (premature ventricular contractions) 6/17/2012       Allergies   Allergen Reactions    Doxycycline Hives       Past Surgical History:   Procedure Laterality Date    HX ORTHOPAEDIC         Social History     Social History    Marital status:      Spouse name: N/A    Number of children: N/A    Years of education: N/A     Social History Main Topics    Smoking status: Never Smoker    Smokeless tobacco: Never Used    Alcohol use Yes      Comment: Occasionally    Drug use: None    Sexual activity: Yes     Other Topics Concern    None     Social History Narrative       {Review of Systems   Constitutional: Negative for fever. HENT: Negative for congestion and sore throat. Respiratory: Negative for shortness of breath, wheezing and stridor. Cardiovascular: Negative for chest pain and palpitations. Skin: Negative for itching and rash. Neurological: Negative for dizziness, tingling and headaches. Objective:     Vitals:    08/10/17 1421   BP: 140/75   Pulse: 68   Resp: 18   Temp: 98.1 °F (36.7 °C)   TempSrc: Oral   SpO2: 97%   Weight: 241 lb (109.3 kg)   Height: 6' 1\" (1.854 m)      Body mass index is 31.8 kg/(m^2). Physical Exam   Constitutional: He is oriented to person, place, and time. He appears well-developed and well-nourished. No distress. HENT:   Head: Normocephalic and atraumatic. Mouth/Throat: Oropharynx is clear and moist. No uvula swelling. No posterior oropharyngeal edema or posterior oropharyngeal erythema. Eyes: Conjunctivae are normal. Pupils are equal, round, and reactive to light. Neck: Normal range of motion. Neck supple. Cardiovascular: Normal rate and regular rhythm. Pulmonary/Chest: Effort normal and breath sounds normal. No respiratory distress. He has no wheezes. Neurological: He is oriented to person, place, and time. Skin: Skin is warm and dry. No rash noted. No erythema. Small 1 mm area of induration. Patient reports skin is tight throughout chest and arms.  No pitting edema in UE. UE symmetrical        Health Maintenance Due Topic Date Due    DTaP/Tdap/Td series (1 - Tdap) 08/16/1987    FOBT Q 1 YEAR AGE 50-75  08/16/2016    INFLUENZA AGE 9 TO ADULT  08/01/2017       Assessment and orders:     Encounter Diagnoses     ICD-10-CM ICD-9-CM   1. Bee sting, accidental or unintentional, initial encounter T63.441A 989.5     E905.3       1. Bee sting, accidental or unintentional, initial encounter  Advised if swelling or itching in throat to seek immediate medical attention. - predniSONE (DELTASONE) 20 mg tablet; Take 2 Tabs by mouth daily (with breakfast) for 5 days. Indications: Bee  Dispense: 10 Tab; Refill: 0        Plan of care:  Discussed diagnoses in detail with patient. Medication risks/benefits/side effects discussed with patient. All of the patient's questions were addressed. The patient understands and agrees with our plan of care. The patient knows to call back if they are unsure of or forget any changes we discussed today or if the symptoms change. The patient received an After-Visit Summary which contains VS, orders, medication list and allergy list. This can be used as a \"mini-medical record\" should they have to seek medical care while out of town. Follow-up Disposition:  Return if symptoms worsen or fail to improve. No future appointments.     Signed By: Te Lawrence MD     August 10, 2017

## 2017-08-10 NOTE — PROGRESS NOTES
Reviewed record in preparation for visit and have necessary documentation  Opportunity was given for questions  Goals that were addressed and/or need to be completed after this appointment include   Health Maintenance Due   Topic Date Due    DTaP/Tdap/Td series (1 - Tdap) 08/16/1987    FOBT Q 1 YEAR AGE 50-75  08/16/2016    INFLUENZA AGE 9 TO ADULT  08/01/2017

## 2017-08-10 NOTE — MR AVS SNAPSHOT
Visit Information Date & Time Provider Department Dept. Phone Encounter #  
 8/10/2017  2:30 PM Jus Bowen MD Danilo White [id number] Follow-up Instructions Return if symptoms worsen or fail to improve. Upcoming Health Maintenance Date Due DTaP/Tdap/Td series (1 - Tdap) 8/16/1987 FOBT Q 1 YEAR AGE 50-75 8/16/2016 INFLUENZA AGE 9 TO ADULT 8/1/2017 Allergies as of 8/10/2017  Review Complete On: 8/10/2017 By: Jus Bowen MD  
  
 Severity Noted Reaction Type Reactions Doxycycline Medium 07/15/2011    Hives Current Immunizations  Reviewed on 10/5/2011 No immunizations on file. Not reviewed this visit You Were Diagnosed With   
  
 Codes Comments Bee sting, accidental or unintentional, initial encounter    -  Primary ICD-10-CM: R53.724G ICD-9-CM: 989.5, E905.3 Vitals BP Pulse Temp Resp Height(growth percentile) Weight(growth percentile) 140/75 68 98.1 °F (36.7 °C) (Oral) 18 6' 1\" (1.854 m) 241 lb (109.3 kg) SpO2 BMI Smoking Status 97% 31.8 kg/m2 Never Smoker Vitals History BMI and BSA Data Body Mass Index Body Surface Area  
 31.8 kg/m 2 2.37 m 2 Preferred Pharmacy Pharmacy Name Phone [address] [place], [place] - [address] [phone] Your Updated Medication List  
  
   
This list is accurate as of: 8/10/17  2:39 PM.  Always use your most recent med list.  
  
  
  
  
 lactulose 10 gram/15 mL solution Commonly known as:  Cherene Colla Take 30 mL by mouth three (3) times daily. meclizine 25 mg tablet Commonly known as:  ANTIVERT Take 1 Tab by mouth three (3) times daily as needed. naproxen 500 mg tablet Commonly known as:  NAPROSYN Take 1 Tab by mouth two (2) times daily (with meals) for 14 days. predniSONE 20 mg tablet Commonly known as:  Mary Lou Johnson  
 Take 2 Tabs by mouth daily (with breakfast) for 5 days. Indications: Bee  
  
 promethazine-codeine 6.25-10 mg/5 mL syrup Commonly known as:  PHENERGAN with CODEINE Take 5 mL by mouth four (4) times daily as needed for Cough. Max Daily Amount: 20 mL. Prescriptions Sent to Pharmacy Refills  
 predniSONE (DELTASONE) 20 mg tablet 0 Sig: Take 2 Tabs by mouth daily (with breakfast) for 5 days. Indications: Bee Class: Normal  
 Pharmacy: [facility] #: [phone] Route: Oral  
  
Follow-up Instructions Return if symptoms worsen or fail to improve. Patient Instructions Insect Stings and Bites: Care Instructions Your Care Instructions Stings and bites from bees, wasps, ants, and other insects often cause pain, swelling, redness, and itching. In some people, especially children, the redness and swelling may be worse. It may extend several inches beyond the affected area. But in most cases, stings and bites don't cause reactions all over the body. If you have had a reaction to an insect sting or bite, you are at risk for a reaction if you get stung or bitten again. Follow-up care is a key part of your treatment and safety. Be sure to make and go to all appointments, and call your doctor if you are having problems. It's also a good idea to know your test results and keep a list of the medicines you take. How can you care for yourself at home? · Do not scratch or rub the skin where the sting or bite occurred. · Put a cold pack or ice cube on the area. Put a thin cloth between the ice and your skin. For some people, a paste of baking soda mixed with a little water helps relieve pain and decrease the reaction. · Take an over-the-counter antihistamine, such as diphenhydramine (Benadryl) or loratadine (Claritin), to relieve swelling, redness, and itching. Calamine lotion or hydrocortisone cream may also help.  Do not give antihistamines to your child unless you have checked with the doctor first. 
· Be safe with medicines. If your doctor prescribed medicine for your allergy, take it exactly as prescribed. Call your doctor if you think you are having a problem with your medicine. You will get more details on the specific medicines your doctor prescribes. · Your doctor may prescribe a shot of epinephrine to carry with you in case you have a severe reaction. Learn how and when to give yourself the shot, and keep it with you at all times. Make sure it has not . · Go to the emergency room anytime you have a severe reaction. Go even if you have given yourself epinephrine and are feeling better. Symptoms can come back. When should you call for help? Call 911 anytime you think you may need emergency care. For example, call if: 
· You have symptoms of a severe allergic reaction. These may include: 
¨ Sudden raised, red areas (hives) all over your body. ¨ Swelling of the throat, mouth, lips, or tongue. ¨ Trouble breathing. ¨ Passing out (losing consciousness). Or you may feel very lightheaded or suddenly feel weak, confused, or restless. Call your doctor now or seek immediate medical care if: 
· You have symptoms of an allergic reaction not right at the sting or bite, such as: ¨ A rash or small area of hives (raised, red areas on the skin). ¨ Itching. ¨ Swelling. ¨ Belly pain, nausea, or vomiting. · You have a lot of swelling around the site (such as your entire arm or leg is swollen). · You have signs of infection, such as: 
¨ Increased pain, swelling, redness, or warmth around the sting. ¨ Red streaks leading from the area. ¨ Pus draining from the sting. ¨ A fever. Watch closely for changes in your health, and be sure to contact your doctor if: 
· You do not get better as expected. Where can you learn more? Go to http://ella-kristian.info/. Enter P390 in the search box to learn more about \"Insect Stings and Bites: Care Instructions. \" Current as of: March 20, 2017 Content Version: 11.3 © 6548-1977 [employer]. Care instructions adapted under license by [employer] (which disclaims liability or warranty for this information). If you have questions about a medical condition or this instruction, always ask your healthcare professional. [place] [address] any warranty or liability for your use of this information. Prednisone (predniSONE Intensol, Prednicot, Deltasone, Makayla) - (By mouth) Why this medicine is used:  
Treats many diseases and conditions, especially problems related to inflammation. Contact a nurse or doctor right away if you have: 
· Rapid weight gain; swelling in your hands, ankles, or feet · Severe stomach pain, nausea, vomiting, or red or black stools · Depression, unusual thoughts, feelings, or behaviors, trouble sleeping · Fever, chills, cough, sore throat, and body aches · Trouble seeing, eye pain Common side effects: 
· Increased appetite, weight gain · Round, puffy face · Muscle pain, weakness © 2017 [facility] Information is for End User's use only and may not be sold, redistributed or otherwise used for commercial purposes. Introducing \[facility] [place] [facility]\"" & HEALTH SERVICES! Steven Goddard introduces WhistleTalk patient portal. Now you can access parts of your medical record, email your doctor's office, and request medication refills online. 1. In your internet browser, go to [email or web address] 2. Click on the First Time User? Click Here link in the Sign In box. You will see the New Member Sign Up page. 3. Enter your WhistleTalk Access Code exactly as it appears below. You will not need to use this code after youve completed the sign-up process. If you do not sign up before the expiration date, you must request a new code. · Covacsis Access Code: [email or web address] Expires: 10/25/2017  4:41 PM 
 
4. Enter the last four digits of your Social Security Number (xxxx) and Date of Birth (mm/dd/yyyy) as indicated and click Submit. You will be taken to the next sign-up page. 5. Create a Covacsis ID. This will be your Covacsis login ID and cannot be changed, so think of one that is secure and easy to remember. 6. Create a Covacsis password. You can change your password at any time. 7. Enter your Password Reset Question and Answer. This can be used at a later time if you forget your password. 8. Enter your e-mail address. You will receive e-mail notification when new information is available in [address] 9. Click Sign Up. You can now view and download portions of your medical record. 10. Click the Download Summary menu link to download a portable copy of your medical information. If you have questions, please visit the Frequently Asked Questions section of the Covacsis website. Remember, Covacsis is NOT to be used for urgent needs. For medical emergencies, dial 911. Now available from your iPhone and Android! Please provide this summary of care documentation to your next provider. Your primary care clinician is listed as Phys Other. If you have any questions after today's visit, please call [phone].

## 2017-08-10 NOTE — TELEPHONE ENCOUNTER
Spoke with patient who had bee sting this morning on upper triceps and is experiencing swelling down to fingertips. Patient denies SOB or difficulty swallowing. Appointment scheduled for patient to be seen today. Advised if symptoms worsen before appointment to proceed to ER. Patient expressed understanding.

## 2020-01-30 ENCOUNTER — OFFICE VISIT (OUTPATIENT)
Dept: FAMILY MEDICINE CLINIC | Age: 55
End: 2020-01-30

## 2020-01-30 VITALS
WEIGHT: 242 LBS | RESPIRATION RATE: 18 BRPM | TEMPERATURE: 98.2 F | HEIGHT: 73 IN | DIASTOLIC BLOOD PRESSURE: 76 MMHG | OXYGEN SATURATION: 95 % | SYSTOLIC BLOOD PRESSURE: 128 MMHG | HEART RATE: 66 BPM | BODY MASS INDEX: 32.07 KG/M2

## 2020-01-30 DIAGNOSIS — R52 BODY ACHES: ICD-10-CM

## 2020-01-30 DIAGNOSIS — R50.9 FEVER, UNSPECIFIED FEVER CAUSE: ICD-10-CM

## 2020-01-30 DIAGNOSIS — J02.9 SORE THROAT: ICD-10-CM

## 2020-01-30 DIAGNOSIS — I10 ESSENTIAL HYPERTENSION: ICD-10-CM

## 2020-01-30 DIAGNOSIS — R05.9 COUGH: Primary | ICD-10-CM

## 2020-01-30 LAB
QUICKVUE INFLUENZA TEST: NEGATIVE
VALID INTERNAL CONTROL?: YES

## 2020-01-30 RX ORDER — BENZONATATE 100 MG/1
100 CAPSULE ORAL
Qty: 30 CAP | Refills: 1 | Status: SHIPPED | OUTPATIENT
Start: 2020-01-30 | End: 2020-07-21

## 2020-01-30 NOTE — PROGRESS NOTES
Progress Note    Patient: Enrique Hoffman MRN: [id number]  SSN: [id number]    YOB: 1965  Age: 49 y.o. Sex: male        Chief Complaint   Patient presents with    Cold Symptoms         Subjective:     Problems addressed:  Encounter Diagnoses     ICD-10-CM ICD-9-CM   1. Cough R05 786.2   2. Sore throat J02.9 462   3. Body aches R52 780.96   4. Fever, unspecified fever cause R50.9 780.60   5. Essential hypertension I10 401.9       HPI: 49 y.o. y/o male with PMH of HTN and eczema presents for 4 days of cold symptoms. Symptoms started 4 days ago with cough, sore throat, subjective fever, and body aches. Pt taking leftover phenergan with codeine cough syrup last night which helped \"a lot\". Pt also taking Dayquil, Nyquil, sudafed, and zyrtec-D with not much relief. Pt states cough is what is bothering him the most. Pt denies any sick contacts. Pt gets blood work done and BP checked at [facility] yearly, states BP \"always\" 120s/80s\". Pt will request lab records to give to us. Pt denies any dizziness, CP, SOB, N/V. Current and past medical information:    Current Medications after this visit[de-identified]     Current Outpatient Medications   Medication Sig    benzonatate (TESSALON) 100 mg capsule Take 1 Cap by mouth three (3) times daily as needed for Cough.  meclizine (ANTIVERT) 25 mg tablet Take 1 Tab by mouth three (3) times daily as needed.  lactulose (CHRONULAC) 10 gram/15 mL solution Take 30 mL by mouth three (3) times daily.  promethazine-codeine (PHENERGAN WITH CODEINE) 6.25-10 mg/5 mL syrup Take 5 mL by mouth four (4) times daily as needed for Cough. Max Daily Amount: 20 mL. No current facility-administered medications for this visit.         Patient Active Problem List    Diagnosis Date Noted    Chest pain, unspecified 06/17/2012    PVC's (premature ventricular contractions) 06/17/2012    HTN (hypertension) 04/12/2012    Poor eyesight 01/06/2012    Epididymitis 06/08/2011    Epididymitis 06/08/2011    Bruising 05/12/2011    Eczema 01/27/2011    Cough 01/07/2011    Congestion 01/07/2011       Past Medical History:   Diagnosis Date    Chest pain, unspecified 6/17/2012    Hypertension     PVC's (premature ventricular contractions) 6/17/2012       Allergies   Allergen Reactions    Doxycycline Hives       Past Surgical History:   Procedure Laterality Date    HX ORTHOPAEDIC         Social History     Tobacco Use    Smoking status: Never Smoker    Smokeless tobacco: Never Used   Substance Use Topics    Alcohol use: Yes     Comment: Occasionally    Drug use: Not on file         Review of Systems   Constitutional: Positive for chills, fever and malaise/fatigue. HENT: Positive for sore throat. Respiratory: Positive for cough. Negative for shortness of breath. Cardiovascular: Negative for chest pain. Gastrointestinal: Negative for constipation, diarrhea, nausea and vomiting. Musculoskeletal: Positive for myalgias. Objective:     Vitals:    01/30/20 1342 01/30/20 1429   BP: 147/85 128/76   Pulse: 66    Resp: 18    Temp: 98.2 °F (36.8 °C)    TempSrc: Oral    SpO2: 95%    Weight: 242 lb (109.8 kg)    Height: 6' 1\" (1.854 m)       Body mass index is 31.93 kg/m². Physical Exam  Vitals signs reviewed. Constitutional:       General: He is not in acute distress. Appearance: Normal appearance. He is not diaphoretic. HENT:      Head: Normocephalic and atraumatic. Right Ear: External ear normal.      Left Ear: External ear normal.      Mouth/Throat:      Mouth: Mucous membranes are moist.      Pharynx: Oropharynx is clear. Eyes:      Conjunctiva/sclera: Conjunctivae normal.      Pupils: Pupils are equal, round, and reactive to light. Neck:      Musculoskeletal: Neck supple. Cardiovascular:      Rate and Rhythm: Normal rate and regular rhythm. Heart sounds: No murmur. Pulmonary:      Effort: Pulmonary effort is normal. No respiratory distress.       Breath sounds: No wheezing or rhonchi. Musculoskeletal:         General: No swelling, tenderness or deformity. Skin:     General: Skin is warm and dry. Neurological:      General: No focal deficit present. Mental Status: He is alert. Psychiatric:         Mood and Affect: Mood normal.         Behavior: Behavior normal.          Health Maintenance Due   Topic Date Due    DTaP/Tdap/Td series (1 - Tdap) 08/16/1977    Shingrix Vaccine Age 50> (1 of 2) 08/16/2016    FOBT Q 1 YEAR AGE 50-75  08/16/2016       Assessment and orders:     Encounter Diagnoses     ICD-10-CM ICD-9-CM   1. Cough R05 786.2   2. Sore throat J02.9 462   3. Body aches R52 780.96   4. Fever, unspecified fever cause R50.9 780.60   5. Essential hypertension I10 401.9       48 y/o M with 4 days of cold symptoms and cough with negative rapid flu test    1. Cough - likely 2/2 to viral URI, will not refill codeine cough syrup at this time, will try tessalon pears  -START benzonatate (TESSALON) 100 mg capsule; Take 1 Cap by mouth three (3) times daily as needed for Cough. Dispense: 30 Cap; Refill: 1  -Encouraging good PO hydration  -OTC cough medications as needed  - AMB POC RAPID INFLUENZA TEST    2. Sore throat, body aches, and subjective fever - afebrile in clinic, likely 2/2 to viral URI  -Supportive care  -Encouraging good PO hydration  -OTC cold medications, tylenol, and motrin PRN  - AMB POC RAPID INFLUENZA TEST    3. HTN - BP high, but 128/76 on recheck, not currently on any BP medications   -Instructed pt to stop taking sudafed and zyrtec-D, to only take one (or none if they are not helping)  -Pt to RTC for BP check when acute illness resolved  -Pt to check BP at work with arm BP cuff and bring in log of readings         Plan of care:  Discussed diagnoses in detail with patient. Medication risks/benefits/side effects discussed with patient. All of the patient's questions were addressed.  The patient understands and agrees with our plan of care. The patient knows to call back if they are unsure of or forget any changes we discussed today or if the symptoms change. The patient received an After-Visit Summary which contains VS, orders, medication list and allergy list. This can be used as a \"mini-medical record\" should they have to seek medical care while out of town. Follow-up and Dispositions    · Return in about 1 week (around 2/6/2020), or if symptoms worsen or fail to improve, for Blood Pressure Check. No future appointments.     Signed By: Merry Siegel MD     January 30, 2020

## 2020-01-30 NOTE — PROGRESS NOTES
1. Have you been to the ER, urgent care clinic since your last visit? Hospitalized since your last visit? No    2. Have you seen or consulted any other health care providers outside of the [address] [place] [address] since your last visit? Include any pap smears or colon screening. No    Reviewed record in preparation for visit and have necessary documentation  Goals that were addressed and/or need to be completed during or after this appointment include     Health Maintenance Due   Topic Date Due    DTaP/Tdap/Td series (1 - Tdap) 08/16/1977    Shingrix Vaccine Age 50> (1 of 2) 08/16/2016    FOBT Q 1 YEAR AGE 50-75  08/16/2016    Influenza Age 5 to Adult  08/01/2019       Patient is accompanied by self I have received verbal consent from Franny Nguyen to discuss any/all medical information while they are present in the room.

## 2020-01-30 NOTE — PATIENT INSTRUCTIONS
Cough: Care Instructions Your Care Instructions A cough is your body's response to something that bothers your throat or airways. Many things can cause a cough. You might cough because of a cold or the flu, bronchitis, or asthma. Smoking, postnasal drip, allergies, and stomach acid that backs up into your throat also can cause coughs. A cough is a symptom, not a disease. Most coughs stop when the cause, such as a cold, goes away. You can take a few steps at home to cough less and feel better. Follow-up care is a key part of your treatment and safety. Be sure to make and go to all appointments, and call your doctor if you are having problems. It's also a good idea to know your test results and keep a list of the medicines you take. How can you care for yourself at home? · Drink lots of water and other fluids. This helps thin the mucus and soothes a dry or sore throat. Honey or lemon juice in hot water or tea may ease a dry cough. · Take cough medicine as directed by your doctor. · Prop up your head on pillows to help you breathe and ease a dry cough. · Try cough drops to soothe a dry or sore throat. Cough drops don't stop a cough. Medicine-flavored cough drops are no better than candy-flavored drops or hard candy. · Do not smoke. Avoid secondhand smoke. If you need help quitting, talk to your doctor about stop-smoking programs and medicines. These can increase your chances of quitting for good. When should you call for help? Call 911 anytime you think you may need emergency care.  For example, call if: 
  · You have severe trouble breathing.  
 Call your doctor now or seek immediate medical care if: 
  · You cough up blood.  
  · You have new or worse trouble breathing.  
  · You have a new or higher fever.  
  · You have a new rash.  
 Watch closely for changes in your health, and be sure to contact your doctor if: 
  · You cough more deeply or more often, especially if you notice more mucus or a change in the color of your mucus.  
  · You have new symptoms, such as a sore throat, an earache, or sinus pain.  
  · You do not get better as expected. Where can you learn more? Go to http://ella-kristian.info/. Enter D279 in the search box to learn more about \"Cough: Care Instructions. \" Current as of: June 9, 2019 Content Version: 12.2 © 6110-7497 [employer]. Care instructions adapted under license by [employer] (which disclaims liability or warranty for this information). If you have questions about a medical condition or this instruction, always ask your healthcare professional. [place] [address] any warranty or liability for your use of this information. Upper Respiratory Infection (Cold): Care Instructions Your Care Instructions An upper respiratory infection, or URI, is an infection of the nose, sinuses, or throat. URIs are spread by coughs, sneezes, and direct contact. The common cold is the most frequent kind of URI. The flu and sinus infections are other kinds of URIs. Almost all URIs are caused by viruses. Antibiotics won't cure them. But you can treat most infections with home care. This may include drinking lots of fluids and taking over-the-counter pain medicine. You will probably feel better in 4 to 10 days. The doctor has checked you carefully, but problems can develop later. If you notice any problems or new symptoms, get medical treatment right away. Follow-up care is a key part of your treatment and safety. Be sure to make and go to all appointments, and call your doctor if you are having problems. It's also a good idea to know your test results and keep a list of the medicines you take. How can you care for yourself at home? · To prevent dehydration, drink plenty of fluids, enough so that your urine is light yellow or clear like water.  Choose water and other caffeine-free clear liquids until you feel better. If you have kidney, heart, or liver disease and have to limit fluids, talk with your doctor before you increase the amount of fluids you drink. · Take an over-the-counter pain medicine, such as acetaminophen (Tylenol), ibuprofen (Advil, Motrin), or naproxen (Aleve). Read and follow all instructions on the label. · Before you use cough and cold medicines, check the label. These medicines may not be safe for young children or for people with certain health problems. · Be careful when taking over-the-counter cold or flu medicines and Tylenol at the same time. Many of these medicines have acetaminophen, which is Tylenol. Read the labels to make sure that you are not taking more than the recommended dose. Too much acetaminophen (Tylenol) can be harmful. · Get plenty of rest. 
· Do not smoke or allow others to smoke around you. If you need help quitting, talk to your doctor about stop-smoking programs and medicines. These can increase your chances of quitting for good. When should you call for help? Call 911 anytime you think you may need emergency care. For example, call if: 
  · You have severe trouble breathing.  
 Call your doctor now or seek immediate medical care if: 
  · You seem to be getting much sicker.  
  · You have new or worse trouble breathing.  
  · You have a new or higher fever.  
  · You have a new rash.  
 Watch closely for changes in your health, and be sure to contact your doctor if: 
  · You have a new symptom, such as a sore throat, an earache, or sinus pain.  
  · You cough more deeply or more often, especially if you notice more mucus or a change in the color of your mucus.  
  · You do not get better as expected. Where can you learn more? Go to http://ella-kristian.info/. Enter O554 in the search box to learn more about \"Upper Respiratory Infection (Cold): Care Instructions. \" Current as of: June 9, 2019 Content Version: 12.2 © 0559-1902 [employer]. Care instructions adapted under license by [employer] (which disclaims liability or warranty for this information). If you have questions about a medical condition or this instruction, always ask your healthcare professional. [address] any warranty or liability for your use of this information.

## 2020-01-30 NOTE — PROGRESS NOTES
I discussed the findings, assessment and plan in detail with the resident and agree with the resident's findings and plan as documented in the resident's note. Peg Chang M.D.

## 2020-07-21 ENCOUNTER — VIRTUAL VISIT (OUTPATIENT)
Dept: FAMILY MEDICINE CLINIC | Age: 55
End: 2020-07-21

## 2020-07-21 DIAGNOSIS — H81.11 BPPV (BENIGN PAROXYSMAL POSITIONAL VERTIGO), RIGHT: Primary | ICD-10-CM

## 2020-07-21 RX ORDER — MECLIZINE HYDROCHLORIDE 25 MG/1
25 TABLET ORAL
Qty: 60 TAB | Refills: 0 | Status: SHIPPED | OUTPATIENT
Start: 2020-07-21 | End: 2020-09-21

## 2020-07-21 RX ORDER — DICLOFENAC SODIUM 75 MG/1
TABLET, DELAYED RELEASE ORAL
COMMUNITY
Start: 2020-07-02 | End: 2020-09-21

## 2020-07-21 RX ORDER — MECLIZINE HYDROCHLORIDE 25 MG/1
25 TABLET ORAL
Qty: 60 TAB | Refills: 0 | Status: SHIPPED | OUTPATIENT
Start: 2020-07-21 | End: 2020-07-21

## 2020-07-21 NOTE — PROGRESS NOTES
Fredo Smith is a 49 y.o. male evaluated via Doximetry on 20. Patient Identity confirmed by . Consent:  He and/or health care decision maker is aware that that he may receive a bill for this telephone service, depending on his insurance coverage, and has provided verbal consent to proceed: Yes    Physician Location: Office  Patient Location: Home  Nurse Assisting with Encounter: Yuniel Ahmadi LPN    Chief Complaint   Patient presents with    Dizziness      Information gathered from patient and/or health care decision maker. HPI:   Vertigo:  - Onset: 1 day ago. - Duration/Timing: intermittent lasting for Seconds to minutes. - Frequency: Several times daily  - Current Symptoms: spinning and unsteadiness. denies Tinnitus. - Associated Symptoms: Endorses nausea. Denies loss of hearing, tinnitus and headaches. denies loss of consciousness.  - Precipitants: looking  to the right and rolling over in bed  - Current Vertigo medications: meclizine (Antivert)  - Medications/supplements associated with Vertigo: None    - History of Vertigo: Yes  - History of environmental allergies: No   - History of Migraines: No   - History of CVA/TBI: No   - Recent viral illness: No     Review of Systems   Constitutional: Negative for chills and fever. HENT: Negative for congestion. Respiratory: Negative for cough. Cardiovascular: Negative for chest pain. Neurological: Negative for headaches. Limited Exam:  Due to this being a TeleHealth evaluation, many elements of the physical examination are unable to be assessed.       Constitutional: Appears well-developed and well-nourished in no apparent distress   Mental status: Alert and awake, Oriented to person/place/time, Able to follow commands  Eyes: EOM normal, Sclera normal, No visible ocular discharge  HENT: Normocephalic, atraumatic; Mouth/Throat: Moist mucous membranes, External Ears normal  Neck: No visualized mass  Pulmonary/Chest: Respiratory effort normal, No visualized signs of difficulty breathing or respiratory distress   Musculoskeletal: Normal gait with no signs of ataxia, Normal range of motion of neck  Neurological: No facial asymmetry (Cranial nerve 7 motor function), No gaze palsy  Skin: No significant exanthematous lesions or discoloration noted on facial skin  Psychiatric: Normal affect, normal judgment/insight. No hallucinations     Current Outpatient Medications on File Prior to Visit   Medication Sig Dispense Refill    diclofenac EC (VOLTAREN) 75 mg EC tablet TAKE 1 TABLET BY MOUTH TWICE A DAY      [DISCONTINUED] benzonatate (TESSALON) 100 mg capsule Take 1 Cap by mouth three (3) times daily as needed for Cough. 30 Cap 1    [DISCONTINUED] meclizine (ANTIVERT) 25 mg tablet Take 1 Tab by mouth three (3) times daily as needed. 20 Tab 0    [DISCONTINUED] lactulose (CHRONULAC) 10 gram/15 mL solution Take 30 mL by mouth three (3) times daily. 236 mL 0    [DISCONTINUED] promethazine-codeine (PHENERGAN WITH CODEINE) 6.25-10 mg/5 mL syrup Take 5 mL by mouth four (4) times daily as needed for Cough. Max Daily Amount: 20 mL. 180 mL 1     No current facility-administered medications on file prior to visit.          Allergies   Allergen Reactions    Doxycycline Hives        Patient Active Problem List    Diagnosis Date Noted    Chest pain, unspecified 06/17/2012    PVC's (premature ventricular contractions) 06/17/2012    HTN (hypertension) 04/12/2012    Poor eyesight 01/06/2012    Epididymitis 06/08/2011    Epididymitis 06/08/2011    Bruising 05/12/2011    Eczema 01/27/2011    Cough 01/07/2011    Congestion 01/07/2011        Health Maintenance Due   Topic Date Due    DTaP/Tdap/Td series (1 - Tdap) 08/16/1987    Shingrix Vaccine Age 50> (1 of 2) 08/16/2016    FOBT Q1Y Age 50-75  08/16/2016        Assessment/Plan:  Details of this discussion including any medical advice provided: Refill of Meclizine and discussed Epley Maneuver. ICD-10-CM ICD-9-CM    1. BPPV (benign paroxysmal positional vertigo), right  H81.11 386.11 meclizine (ANTIVERT) 25 mg tablet      DISCONTINUED: meclizine (ANTIVERT) 25 mg tablet     Follow-up and Dispositions    · Return if symptoms worsen or fail to improve. Total Time: minutes: 5-10 minutes was spent on telemedicine encounter discussing above problems and plans. Patient Problem list, medications, and Allergies were reviewed during this encounter. Pursuant to the emergency declaration under the [address] [place] [address], [facility] waiver authority and the [employer] and [employer] Act, this Telephone Visit was conducted, with patient's consent, to reduce the patient's risk of exposure to COVID-19 and provide continuity of care for an established patient. I affirm this is a Patient Initiated Episode with an Established Patient who has not had a related appointment within my department in the past 7 days or scheduled within the next 24 hours. Discussed diagnoses in detail with patient. Medication risks/benefits/side effects discussed with patient. All of the patient's questions were addressed. The patient understands and agrees with our plan of care. The patient knows to call back if they are unsure of or forget any changes we discussed today or if the symptoms change.     Note: not billable if this call serves to triage the patient into an appointment for the relevant concern    MD ELAINE Frederick & CAROL ÁLVAREZ [facility]  07/21/20

## 2020-07-21 NOTE — PROGRESS NOTES
1. Have you been to the ER, urgent care clinic since your last visit? Hospitalized since your last visit? No    2. Have you seen or consulted any other health care providers outside of the [facility] since your last visit? Include any pap smears or colon screening.  No        Health Maintenance Due   Topic Date Due    DTaP/Tdap/Td series (1 - Tdap) 08/16/1987    Shingrix Vaccine Age 50> (1 of 2) 08/16/2016    FOBT Q1Y Age 50-75  08/16/2016

## 2020-08-08 ENCOUNTER — TELEPHONE (OUTPATIENT)
Dept: FAMILY MEDICINE CLINIC | Age: 55
End: 2020-08-08

## 2020-08-08 NOTE — TELEPHONE ENCOUNTER
Call from , patient c/o of high BP. Spoke to patient, he reports /83. He checked it this morning, reports sitting still for sometime prior to checking it. Denies caffeine drinks or stress. On average his BP is ~137/72. Recommended to follow up with PCP, seems like he didn't have labs checked since 2017. Visit on 8/10 at 11 am with Dr Yonatan Haddad. Recommended to record BP values and bring log to clinic. ER precautions discussed.      Elfego Sutherland MD

## 2020-08-10 ENCOUNTER — HOSPITAL ENCOUNTER (OUTPATIENT)
Dept: LAB | Age: 55
Discharge: HOME | End: 2020-08-10

## 2020-08-10 ENCOUNTER — VIRTUAL VISIT (OUTPATIENT)
Dept: FAMILY MEDICINE CLINIC | Age: 55
End: 2020-08-10
Payer: COMMERCIAL

## 2020-08-10 DIAGNOSIS — I10 ESSENTIAL HYPERTENSION: ICD-10-CM

## 2020-08-10 DIAGNOSIS — I10 ESSENTIAL HYPERTENSION: Primary | ICD-10-CM

## 2020-08-10 LAB
ALBUMIN SERPL-MCNC: 4.1 G/DL (ref 3.5–5)
ALBUMIN/GLOB SERPL: 1.6 {RATIO} (ref 1.1–2.2)
ALP SERPL-CCNC: 65 U/L (ref 45–117)
ALT SERPL-CCNC: 50 U/L (ref 12–78)
ANION GAP SERPL CALC-SCNC: 8 MMOL/L (ref 5–15)
AST SERPL-CCNC: 29 U/L (ref 15–37)
BILIRUB SERPL-MCNC: 0.4 MG/DL (ref 0.2–1)
BUN SERPL-MCNC: 16 MG/DL (ref 6–20)
BUN/CREAT SERPL: 16 (ref 12–20)
CALCIUM SERPL-MCNC: 8.9 MG/DL (ref 8.5–10.1)
CHLORIDE SERPL-SCNC: 107 MMOL/L (ref 97–108)
CHOLEST SERPL-MCNC: 160 MG/DL
CO2 SERPL-SCNC: 26 MMOL/L (ref 21–32)
COMMENT, HOLDF: NORMAL
CREAT SERPL-MCNC: 0.99 MG/DL (ref 0.7–1.3)
GLOBULIN SER CALC-MCNC: 2.6 G/DL (ref 2–4)
GLUCOSE SERPL-MCNC: 148 MG/DL (ref 65–100)
HDLC SERPL-MCNC: 31 MG/DL
HDLC SERPL: 5.2 {RATIO} (ref 0–5)
LDLC SERPL CALC-MCNC: ABNORMAL MG/DL (ref 0–100)
LDLC SERPL DIRECT ASSAY-MCNC: 92 MG/DL (ref 0–100)
LIPID PROFILE,FLP: ABNORMAL
POTASSIUM SERPL-SCNC: 3.8 MMOL/L (ref 3.5–5.1)
PROT SERPL-MCNC: 6.7 G/DL (ref 6.4–8.2)
SAMPLES BEING HELD,HOLD: NORMAL
SODIUM SERPL-SCNC: 141 MMOL/L (ref 136–145)
TRIGL SERPL-MCNC: 406 MG/DL (ref ?–150)
TSH SERPL DL<=0.05 MIU/L-ACNC: 1.75 UIU/ML (ref 0.36–3.74)
VLDLC SERPL CALC-MCNC: ABNORMAL MG/DL

## 2020-08-10 PROCEDURE — 99213 OFFICE O/P EST LOW 20 MIN: CPT | Performed by: FAMILY MEDICINE

## 2020-08-10 RX ORDER — LISINOPRIL 10 MG/1
10 TABLET ORAL DAILY
Qty: 30 TAB | Refills: 3 | Status: SHIPPED | OUTPATIENT
Start: 2020-08-10 | End: 2020-08-10 | Stop reason: SDUPTHER

## 2020-08-10 RX ORDER — LISINOPRIL 10 MG/1
10 TABLET ORAL DAILY
Qty: 30 TAB | Refills: 3 | Status: SHIPPED | OUTPATIENT
Start: 2020-08-10 | End: 2020-08-14 | Stop reason: SDUPTHER

## 2020-08-10 NOTE — PROGRESS NOTES
Lolis Donato is a 49 y.o. male who was seen by synchronous (real-time) audio-video technology. Consent:  Patient and/or their healthcare decision maker is aware that this patient-initiated Telehealth encounter is a billable service, with coverage as determined by their insurance carrier. They are aware that they may receive a bill and have provided verbal consent to proceed: Yes    I was in the office while conducting this encounter. Platform: Doxy. me    HPI: Pt is a 49 y.o. male who presents for HTN. He reports that he has been checking his BP at home and it has ranged 140's/80-90's. He denies HA, changes in vision, and LOLITA and has been feeling well otherwise. 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 Otherwise he is feeling well without concerns. He had been on lisinopril 10mg daily back in 2012 but was able to come off when he lowered his BP with diet and lifestyle modifications. Past Medical History:   Diagnosis Date    Chest pain, unspecified 6/17/2012    Hypertension     PVC's (premature ventricular contractions) 6/17/2012       No family history on file. Social History     Tobacco Use    Smoking status: Never Smoker    Smokeless tobacco: Never Used   Substance Use Topics    Alcohol use: Yes     Comment: Occasionally    Drug use: Not on file       ROS:  Per HPI    PE:  There were no vitals taken for this visit. Gen: Pt in NAD  Head: Normocephalic, atraumatic  Eyes: Sclera anicteric, EOM grossly intact  Throat: MMM,  Neck: Supple  Resp: Speaking clearly in full sentences without respiratory distress  Neuro: Alert, oriented, appropriate      A/P:   Encounter Diagnoses     ICD-10-CM ICD-9-CM   1. Essential hypertension  I10 401.9     1. Essential hypertension: Given multiple home values >140/90 and h/o HTN in the past, will go ahead and start him back on lisinopril. Will get labs as well. - TSH 3RD GENERATION; Future  - LIPID PANEL;  Future  - METABOLIC PANEL, COMPREHENSIVE; Future  - lisinopriL (PRINIVIL, ZESTRIL) 10 mg tablet; Take 1 Tab by mouth daily. 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 Discussed diagnoses in detail with patient. Medication risks/benefits/side effects discussed with patient. All of the patient's questions were addressed. The patient understands and agrees with our plan of care. The patient knows to call back if they are unsure of or forget any changes we discussed today or if the symptoms change. Amos Schilling is a 49 y.o. male being evaluated by a video visit encounter for concerns as above. A caregiver was present when appropriate. Due to this being a TeleHealth encounter (During YPAIO-82 public health emergency), evaluation of the following organ systems was limited: Vitals/Constitutional/EENT/Resp/CV/GI//MS/Neuro/Skin/Heme-Lymph-Imm. Pursuant to the emergency declaration under the [facility], [facility] waiver authority and the [employer] and [employer] Act, this Virtual  Visit was conducted, with patient's (and/or legal guardian's) consent, to reduce the patient's risk of exposure to COVID-19 and provide necessary medical care. Services were provided through a video synchronous discussion virtually to substitute for in-person clinic visit. Patient and provider were located in their home and in the office, respectively. Current Outpatient Medications on File Prior to Visit   Medication Sig Dispense Refill    diclofenac EC (VOLTAREN) 75 mg EC tablet TAKE 1 TABLET BY MOUTH TWICE A DAY      meclizine (ANTIVERT) 25 mg tablet Take 1 Tab by mouth three (3) times daily as needed for Dizziness. 60 Tab 0     No current facility-administered medications on file prior to visit.

## 2020-08-11 ENCOUNTER — TELEPHONE (OUTPATIENT)
Dept: FAMILY MEDICINE CLINIC | Age: 55
End: 2020-08-11

## 2020-08-11 DIAGNOSIS — R73.9 HYPERGLYCEMIA: Primary | ICD-10-CM

## 2020-08-14 ENCOUNTER — TELEPHONE (OUTPATIENT)
Dept: FAMILY MEDICINE CLINIC | Age: 55
End: 2020-08-14

## 2020-08-14 DIAGNOSIS — I10 ESSENTIAL HYPERTENSION: ICD-10-CM

## 2020-08-14 RX ORDER — LISINOPRIL 10 MG/1
10 TABLET ORAL DAILY
Qty: 180 TAB | Refills: 1 | Status: SHIPPED | OUTPATIENT
Start: 2020-08-14 | End: 2020-09-10 | Stop reason: DRUGHIGH

## 2020-08-14 NOTE — TELEPHONE ENCOUNTER
Called to discuss pt's recent labs. He reports he had just finished a heavy lunch prior to having them drawn. In that case will plan to recheck the blood glucose and cholesterol fasting in about 3 months. He will need to have a BMP checked then anyway as he has started the lisinopril. He reports home BP's have been 142'W systolic and he would like to come by the office to have it checked once to make sure his machine is correct. TSH normal. Kidney function and electrolytes normal. Pt needs a letter for the [employer] that he is taking lisinopril. Will write and he will  at the . All questions answered and pt feels comfortable with the plan of care.

## 2020-09-10 ENCOUNTER — OFFICE VISIT (OUTPATIENT)
Dept: FAMILY MEDICINE CLINIC | Age: 55
End: 2020-09-10
Payer: COMMERCIAL

## 2020-09-10 VITALS
SYSTOLIC BLOOD PRESSURE: 133 MMHG | HEART RATE: 78 BPM | HEIGHT: 73 IN | BODY MASS INDEX: 32.2 KG/M2 | OXYGEN SATURATION: 97 % | DIASTOLIC BLOOD PRESSURE: 86 MMHG | RESPIRATION RATE: 15 BRPM | WEIGHT: 243 LBS | TEMPERATURE: 97.3 F

## 2020-09-10 DIAGNOSIS — I10 ESSENTIAL HYPERTENSION: ICD-10-CM

## 2020-09-10 DIAGNOSIS — R73.03 PRE-DIABETES: ICD-10-CM

## 2020-09-10 DIAGNOSIS — F41.0 PANIC DISORDER: Primary | ICD-10-CM

## 2020-09-10 DIAGNOSIS — E66.9 CLASS 1 OBESITY WITH SERIOUS COMORBIDITY AND BODY MASS INDEX (BMI) OF 32.0 TO 32.9 IN ADULT, UNSPECIFIED OBESITY TYPE: ICD-10-CM

## 2020-09-10 PROCEDURE — 99214 OFFICE O/P EST MOD 30 MIN: CPT | Performed by: FAMILY MEDICINE

## 2020-09-10 RX ORDER — HYDROXYZINE 25 MG/1
25 TABLET, FILM COATED ORAL
Qty: 90 TAB | Refills: 1 | Status: SHIPPED | OUTPATIENT
Start: 2020-09-10 | End: 2020-09-21

## 2020-09-10 RX ORDER — BUPROPION HYDROCHLORIDE 300 MG/1
300 TABLET ORAL
Qty: 90 TAB | Refills: 1 | Status: SHIPPED | OUTPATIENT
Start: 2020-09-10 | End: 2020-09-21

## 2020-09-10 RX ORDER — LISINOPRIL 20 MG/1
20 TABLET ORAL DAILY
Qty: 90 TAB | Refills: 1 | Status: SHIPPED | OUTPATIENT
Start: 2020-09-10 | End: 2022-07-26

## 2020-09-10 RX ORDER — BUPROPION HYDROCHLORIDE 150 MG/1
150 TABLET ORAL
Qty: 7 TAB | Refills: 0 | Status: SHIPPED | OUTPATIENT
Start: 2020-09-10 | End: 2020-09-17

## 2020-09-10 NOTE — PATIENT INSTRUCTIONS
Weight Loss Diet Guidelines      Eat ONLY when you are hungry, and stop just before you are full. If you are eating enough fat in your meals, you will feel full for 5-6 hours after, and you can avoid snacks. This is your goal.     Eat More of these Foods:    Meat: Beef, lamb, pork, chicken and others  Fish: Any kind of fish  Eggs: As many as you want, with the yolk  Vegetables: ANY vegetables but limit starchy vegetables like potatoes, corn, carrots or peas  Nuts and Seeds: No more than one handful a day  High-fat Dairy: Cheese, butter, heavy cream      Eat Less of this, but OK Rarely:    Fruits: Berries and Melon are best. May have one handful per day. Limit other fruits, particularly bananas, grapes, mangos, and pineapple  DO NOT DRINK ANY JUICE, EVER. Whole grains: Oatmeal, quinoa, brown rice  Legumes: Beans, lentils      Do Not Eat these Foods:    Drinks with calories: Sodas, fruit juices, sweet tea, sports drinks (gatorade, etc)  Sugar: Honey, agave, candy, cake, cookies, ice cream  Grains: Bread, pasta, crackers, cereal, chips  Yogurt: Most yogurt is as bad as candy. Eat only high-fat, plain yogurt, like Fage 2% plain. Trans Fats: \"Hydrogenated\" or \"partially hydrogenated\" oils, margarine  \"Diet\" and \"Low fat\" Products  Highly processed foods. If it looks like it was made in a factory, don't eat it. If it has more than 5 ingredients, it is processed. What Should Meals Look Like? Breakfast: Dory Apple, eggs, sausage or plain yogurt with berries  Lunch: Big salad with meat, cheese, avocado, homemade dressing or olive oil and vinegar. Or meat, chicken, fish and vegetables. Dinner: Meat, chicken or fish and vegetables, or salad, like above  Snack: Berries, cheese, nuts  Drinks:Plenty of water.  May also have coffee, tea, or artificially sweetened beverages (but not too many)         Panic Attacks: Care Instructions  Your Care Instructions     During a panic attack, you may have a feeling of intense fear or terror, trouble breathing, chest pain or tightness, heartbeat changes, dizziness, sweating, and shaking. A panic attack starts suddenly and usually lasts from 5 to 20 minutes but may last even longer. You have the most anxiety about 10 minutes after the attack starts. An attack can begin with a stressful event, or it can happen without a cause. Although panic attacks can cause scary symptoms, you can learn to manage them with self-care, counseling, and medicine. Follow-up care is a key part of your treatment and safety. Be sure to make and go to all appointments, and call your doctor if you are having problems. It's also a good idea to know your test results and keep a list of the medicines you take. How can you care for yourself at home? · Take your medicine exactly as directed. Call your doctor if you think you are having a problem with your medicine. · Go to your counseling sessions and follow-up appointments. · Recognize and accept your anxiety. Then, when you are in a situation that makes you anxious, say to yourself, \"This is not an emergency. I feel uncomfortable, but I am not in danger. I can keep going even if I feel anxious. \"  · Be kind to your body:  ? Relieve tension with exercise or a massage. ? Get enough rest.  ? Avoid alcohol, caffeine, nicotine, and illegal drugs. They can increase your anxiety level, cause sleep problems, or trigger a panic attack. ? Learn and do relaxation techniques. See below for more about these techniques. · Engage your mind. Get out and do something you enjoy. Go to a funny movie, or take a walk or hike. Plan your day. Having too much or too little to do can make you anxious. · Keep a record of your symptoms. Discuss your fears with a good friend or family member, or join a support group for people with similar problems. Talking to others sometimes relieves stress. · Get involved in social groups, or volunteer to help others.  Being alone sometimes makes things seem worse than they are. · Get at least 30 minutes of exercise on most days of the week to relieve stress. Walking is a good choice. You also may want to do other activities, such as running, swimming, cycling, or playing tennis or team sports. Relaxation techniques  Do relaxation exercises for 10 to 20 minutes a day. You can play soothing, relaxing music while you do them, if you wish. · Tell others in your house that you are going to do your relaxation exercises. Ask them not to disturb you. · Find a comfortable place, away from all distractions and noise. · Lie down on your back, or sit with your back straight. · Focus on your breathing. Make it slow and steady. · Breathe in through your nose. Breathe out through either your nose or mouth. · Breathe deeply, filling up the area between your navel and your rib cage. Breathe so that your belly goes up and down. · Do not hold your breath. · Breathe like this for 5 to 10 minutes. Notice the feeling of calmness throughout your whole body. As you continue to breathe slowly and deeply, relax by doing the following for another 5 to 10 minutes:  · Tighten and relax each muscle group in your body. You can begin at your toes and work your way up to your head. · Imagine your muscle groups relaxing and becoming heavy. · Empty your mind of all thoughts. · Let yourself relax more and more deeply. · Become aware of the state of calmness that surrounds you. · When your relaxation time is over, you can bring yourself back to alertness by moving your fingers and toes and then your hands and feet and then stretching and moving your entire body. Sometimes people fall asleep during relaxation, but they usually wake up shortly afterward. · Always give yourself time to return to full alertness before you drive a car or do anything that might cause an accident if you are not fully alert. Never play a relaxation tape while driving a car. When should you call for help? Call 911 anytime you think you may need emergency care. For example, call if:    · You feel you cannot stop from hurting yourself or someone else. Watch closely for changes in your health, and be sure to contact your doctor if:    · Your panic attacks get worse.     · You have new or different anxiety.     · You are not getting better as expected. Where can you learn more? Go to http://ella-kristian.info/  Enter H601 in the search box to learn more about \"Panic Attacks: Care Instructions. \"  Current as of: January 31, 2020               Content Version: 12.6  © 3235-9385 [employer]. Care instructions adapted under license by [employer] (which disclaims liability or warranty for this information). If you have questions about a medical condition or this instruction, always ask your healthcare professional. [address] any warranty or liability for your use of this information.

## 2020-09-14 ENCOUNTER — PATIENT MESSAGE (OUTPATIENT)
Dept: FAMILY MEDICINE CLINIC | Age: 55
End: 2020-09-14

## 2020-09-14 DIAGNOSIS — I10 ESSENTIAL HYPERTENSION: Primary | ICD-10-CM

## 2020-09-14 NOTE — TELEPHONE ENCOUNTER
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 To which it includes the following. Age. Gender  Race  Blood pressure   TC  LDL  HDL  And from my blood work I did for [employer], I'm a 7.5,   With my blood pressure that was taken 133/82 . As per regulation a 7.5 or grater i have to have a cardiology clearance .  And I'm asking if I get down there and I get nervous for whatever reason, and my BP is more than that, I will need a cardiology clearance in my hand

## 2020-09-21 ENCOUNTER — HOSPITAL ENCOUNTER (OUTPATIENT)
Dept: PREADMISSION TESTING | Age: 55
Discharge: HOME | End: 2020-09-21
Payer: COMMERCIAL

## 2020-09-21 ENCOUNTER — OFFICE VISIT (OUTPATIENT)
Dept: CARDIOLOGY CLINIC | Age: 55
End: 2020-09-21
Payer: COMMERCIAL

## 2020-09-21 VITALS
HEIGHT: 73 IN | WEIGHT: 233 LBS | BODY MASS INDEX: 30.88 KG/M2 | HEART RATE: 57 BPM | RESPIRATION RATE: 16 BRPM | SYSTOLIC BLOOD PRESSURE: 120 MMHG | DIASTOLIC BLOOD PRESSURE: 68 MMHG | OXYGEN SATURATION: 97 %

## 2020-09-21 DIAGNOSIS — Z56.82: ICD-10-CM

## 2020-09-21 DIAGNOSIS — I10 ESSENTIAL HYPERTENSION: ICD-10-CM

## 2020-09-21 DIAGNOSIS — I49.3 PVC'S (PREMATURE VENTRICULAR CONTRACTIONS): Primary | ICD-10-CM

## 2020-09-21 DIAGNOSIS — Z01.812 PRE-PROCEDURE LAB EXAM: ICD-10-CM

## 2020-09-21 DIAGNOSIS — E78.2 MIXED HYPERLIPIDEMIA: ICD-10-CM

## 2020-09-21 PROCEDURE — 99244 OFF/OP CNSLTJ NEW/EST MOD 40: CPT | Performed by: SPECIALIST

## 2020-09-21 PROCEDURE — 87635 SARS-COV-2 COVID-19 AMP PRB: CPT

## 2020-09-21 PROCEDURE — 93000 ELECTROCARDIOGRAM COMPLETE: CPT | Performed by: SPECIALIST

## 2020-09-21 NOTE — PROGRESS NOTES
Carroll Alegria Jr     1965       Flex Waterman MD, [facility]  Date of Visit-9/21/2020   PCP is Alexandria Gonzalez MD   [facility]  [facility]  HPI:  Kelsey Luu is a 48 y.o. male   Referral from Alexandria Gonzalez MD at [place]. Episodes of racing heart in the past. Had a  physical and then saw PCP and was started on Lisinopril. Lisinopril has been increased to 20 mg. 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 He calculated it at 7.5% and it was required that he get cardiology clearance before he ships out. Thus the referral was made. I had seen him previously in 2012. He had a stress test in 2012 and an echo along with a holter. Holter 4/3/12 showed few PVC's. Stress nuclear 4/18/12 walked 14.5 minutes, normal perfusion, EF 52%. Echocardiogram 4/18/12 normal.   I had seen the pt in March of 2012 due to prior PVC's on EKG for the [employer] and . He is a [place] [employer]  with prior PTSD. His last direct LDL is 92. He has TG's of 406. HDL of 31. He currently takes Lisinopril. Exercises regularly, does not feel limited, went to see dtr 2 weeks ago and did 2 mile walk on hilly area with no limitation  Overall the pt states he is doing well. 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 Pt is getting deployed next month. Pt notes sock-line edema. Pt states he is able to run up hills with no issues. He has not felt any PVC's. He notes that in a recent lab with the [employer] his TG's were in the 200's (office TGs had been 400)    Denies chest pain, syncope or shortness of breath at rest, has no tachycardia, palpitations or sense of arrhythmia.       EKG: SB at 57 WNL    Assessment/Plan:    Future Appointments   Date Time Provider Megan Anton   9/24/2020 11:00 AM SAGE GARCIA BS AMB     Patient Instructions   You will be scheduled for a stress test. Please wear comfortable clothes and shoes. Please do not eat or drink anything other than water two hours prior to test. Please get your COVID test 3 days prior at the [address] [place] [address] location. We will call with results. 1. PVC's (premature ventricular contractions)  Prior workup has shown normal heart structure and function. He has no sx's and will continue to observe for arrhythmia.   - AMB POC EKG ROUTINE W/ 12 LEADS, INTER & REP    2.  deployment status  Sounds like prior to deployment given his [place] score he will need an ETT and we will arrange after COVID testing. 3. Essential hypertension  Well controlled on Lisinopril. At goal , meds and possible side effects reviewed and patient denies  Key CAD CHF Meds             lisinopriL (PRINIVIL, ZESTRIL) 20 mg tablet (Taking) Take 1 Tab by mouth daily. BP Readings from Last 6 Encounters:   09/21/20 120/68   09/10/20 133/86   01/30/20 128/76   08/10/17 140/75   08/08/17 127/82   07/27/17 136/74        4. Mixed hyperlipidemia  At goal for LDL. He is continuing an exercise program for HDL and says his TG's were reduced on recheck with the [place]. I don't feel the need for a statin at this time but will continue to monitor for 10 year risk. Key Antihyperlipidemia Meds     The patient is on no antihyperlipidemia meds. Impression:   1. PVC's (premature ventricular contractions)    2.  deployment status    3. Essential hypertension    4. Mixed hyperlipidemia       Cardiac History:   No specialty comments available. ROS- complete multisystem 11 ROS done and reviewed as pertinent  ROS-except as noted above. . A complete cardiac and respiratory are reviewed and negative except as above ; Resp-denies wheezing  or productive cough,.  Const- No unusual weight loss or fever; Neuro-no recent seizure or CVA ; GI- No BRBPR, abdom pain, bloating ; - no  hematuria   see supplement sheet, initialed and to be scanned by staff  Past Medical History:   Diagnosis Date    Chest pain, unspecified 6/17/2012    Hypertension     PVC's (premature ventricular contractions) 6/17/2012      Social Hx= reports that he has never smoked. He has never used smokeless tobacco. He reports current alcohol use. No family hx of CAD  Allergies   Allergen Reactions    Doxycycline Hives      Exam and Labs:  /68 (BP 1 Location: Left arm, BP Patient Position: Sitting)   Pulse (!) 57   Resp 16   Ht 6' 1\" (1.854 m)   Wt 233 lb (105.7 kg)   SpO2 97%   BMI 30.74 kg/m² Constitutional:  NAD, comfortable  Head: NC,AT. Eyes: No scleral icterus. Neck:  Neck supple. No JVD present. Throat: moist mucous membranes. Chest: Effort normal & normal respiratory excursion . Neurological: alert, conversant and oriented . Skin: Skin is not cold. No obvious systemic rash noted. Not diaphoretic. No erythema. Psychiatric:  Grossly normal mood and affect. Behavior appears normal. Extremities:  no clubbing or cyanosis. Abdomen: non distended    Lungs:breath sounds normal. No stridor. distress, wheezes or  Rales. Heart: normal rate, regular rhythm, normal S1, S2, no murmurs, rubs, clicks or gallops , PMI non displaced. Edema: Edema is none.   Lab Results   Component Value Date/Time    Cholesterol, total 160 08/10/2020 01:36 PM    HDL Cholesterol 31 08/10/2020 01:36 PM    LDL,Direct 92 08/10/2020 01:36 PM    LDL, calculated Not calculated due to elevated triglyceride level 08/10/2020 01:36 PM    Triglyceride 406 (H) 08/10/2020 01:36 PM    CHOL/HDL Ratio 5.2 (H) 08/10/2020 01:36 PM     Lab Results   Component Value Date/Time    Sodium 141 08/10/2020 01:36 PM    Potassium 3.8 08/10/2020 01:36 PM    Chloride 107 08/10/2020 01:36 PM    CO2 26 08/10/2020 01:36 PM    Anion gap 8 08/10/2020 01:36 PM    Glucose 148 (H) 08/10/2020 01:36 PM    BUN 16 08/10/2020 01:36 PM    Creatinine 0.99 08/10/2020 01:36 PM    BUN/Creatinine ratio 16 08/10/2020 01:36 PM    GFR est AA >60 08/10/2020 01:36 PM    GFR est non-AA >60 08/10/2020 01:36 PM    Calcium 8.9 08/10/2020 01:36 PM      Wt Readings from Last 3 Encounters:   09/21/20 233 lb (105.7 kg)   09/10/20 243 lb (110.2 kg)   01/30/20 242 lb (109.8 kg)      BP Readings from Last 3 Encounters:   09/21/20 120/68   09/10/20 133/86   01/30/20 128/76      Current Outpatient Medications   Medication Sig    lisinopriL (PRINIVIL, ZESTRIL) 20 mg tablet Take 1 Tab by mouth daily. No current facility-administered medications for this visit. Impression see above.       Written by Benson Berger, as dictated by Tano Bennett MD.

## 2020-09-21 NOTE — PROGRESS NOTES
Visit Vitals  /68 (BP 1 Location: Left arm, BP Patient Position: Sitting)   Pulse (!) 57   Resp 16   Ht 6' 1\" (1.854 m)   Wt 233 lb (105.7 kg)   SpO2 97%   BMI 30.74 kg/m²

## 2020-09-21 NOTE — Clinical Note
9/21/20 Patient: Harjit Brower YOB: 1965 Date of Visit: 9/21/2020 Екатерина Myers, [address] [facility] VIA In Basket Chayo Locke MD 
[address] [place] [address] VIA In Basket Dear MD Chayo Caicedo MD, Thank you for referring Mr. Carroll Alegria Jr to [facility] for evaluation. My notes for this consultation are attached. If you have questions, please do not hesitate to call me. I look forward to following your patient along with you.  
 
 
Sincerely, 
 
Casey Lynn MD

## 2020-09-21 NOTE — PATIENT INSTRUCTIONS
You will be scheduled for a stress echocardiogram. Please wear comfortable clothes and shoes. Please do not eat or drink anything other than water two hours prior to test. Please get your COVID test 3 days prior at the [facility] location. We will call with results.

## 2020-09-22 LAB — SARS-COV-2, COV2NT: NOT DETECTED

## 2020-09-24 ENCOUNTER — ANCILLARY PROCEDURE (OUTPATIENT)
Dept: CARDIOLOGY CLINIC | Age: 55
End: 2020-09-24
Payer: COMMERCIAL

## 2020-09-24 ENCOUNTER — TELEPHONE (OUTPATIENT)
Dept: FAMILY MEDICINE CLINIC | Age: 55
End: 2020-09-24

## 2020-09-24 VITALS — WEIGHT: 233 LBS | HEIGHT: 73 IN | BODY MASS INDEX: 30.88 KG/M2

## 2020-09-24 DIAGNOSIS — Z56.82: ICD-10-CM

## 2020-09-24 DIAGNOSIS — I49.3 PVC'S (PREMATURE VENTRICULAR CONTRACTIONS): ICD-10-CM

## 2020-09-24 DIAGNOSIS — E78.2 MIXED HYPERLIPIDEMIA: ICD-10-CM

## 2020-09-24 DIAGNOSIS — I10 ESSENTIAL HYPERTENSION: ICD-10-CM

## 2020-09-24 PROCEDURE — 93015 CV STRESS TEST SUPVJ I&R: CPT | Performed by: SPECIALIST

## 2020-09-29 ENCOUNTER — TELEPHONE (OUTPATIENT)
Dept: CARDIOLOGY CLINIC | Age: 55
End: 2020-09-29

## 2020-09-29 NOTE — TELEPHONE ENCOUNTER
Verified patient with two types of identifiers. 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 He reports he can  a copy of the letter at [facility] or we can send it to him on [employer] Will update him once we have the letter.

## 2020-09-30 LAB
STRESS ANGINA INDEX: 0
STRESS BASELINE DIAS BP: 72 MMHG
STRESS BASELINE HR: 55 BPM
STRESS BASELINE SYS BP: 124 MMHG
STRESS ESTIMATED WORKLOAD: 13.4 METS
STRESS EXERCISE DUR MIN: NORMAL MIN:SEC
STRESS O2 SAT PEAK: 96 %
STRESS O2 SAT REST: 98 %
STRESS PEAK DIAS BP: 90 MMHG
STRESS PEAK SYS BP: 170 MMHG
STRESS PERCENT HR ACHIEVED: 102 %
STRESS POST PEAK HR: 169 BPM
STRESS RATE PRESSURE PRODUCT: NORMAL BPM*MMHG
STRESS SR DUKE TREADMILL SCORE: 11
STRESS ST DEPRESSION: 0 MM
STRESS ST ELEVATION: 0 MM
STRESS TARGET HR: 166 BPM

## 2020-09-30 NOTE — TELEPHONE ENCOUNTER
Patient would like to [place] regarding his recent test results. Patient was advised that Dr. Juliet Johnson is writing a letter for clearance that can be picked up.     Phone: [phone]

## 2020-10-01 ENCOUNTER — VIRTUAL VISIT (OUTPATIENT)
Dept: FAMILY MEDICINE CLINIC | Age: 55
End: 2020-10-01
Payer: COMMERCIAL

## 2020-10-01 DIAGNOSIS — F41.0 PANIC DISORDER: ICD-10-CM

## 2020-10-01 DIAGNOSIS — I10 ESSENTIAL HYPERTENSION: Primary | ICD-10-CM

## 2020-10-01 PROCEDURE — 99214 OFFICE O/P EST MOD 30 MIN: CPT | Performed by: FAMILY MEDICINE

## 2020-10-01 RX ORDER — HYDROXYZINE 25 MG/1
25 TABLET, FILM COATED ORAL
Qty: 90 TAB | Refills: 1
Start: 2020-10-01 | End: 2022-05-24

## 2020-10-01 NOTE — TELEPHONE ENCOUNTER
Verified patient with two types of identifiers. Patient presents in office to  letter.  Letter given as well as copy of stress test.

## 2020-10-01 NOTE — LETTER
10/1/2020 3:12 PM 
 
Mr. Fuller [address] ZXYBVUM YYBUH [address] [address] To Whom It May Concern: 
 
Ike Ware is currently under the care of Willie Banuelos. He was prescribed lisinopril daily for his blood pressure greater than 90 days ago and his blood pressure has been stable on this medication and is currently on 20mg daily. 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 If there are questions or concerns please have the patient contact our office.  
 
 
 
Sincerely, 
 
 
Sharron Cardona MD

## 2020-10-01 NOTE — PROGRESS NOTES
Krishna Hilliard is a 48 y.o. male who was seen by synchronous (real-time) audio-video technology. Consent:  Patient and/or their healthcare decision maker is aware that this patient-initiated Telehealth encounter is a billable service, with coverage as determined by their insurance carrier. They are aware that they may receive a bill and have provided verbal consent to proceed: Yes    I was in the office while conducting this encounter. Platform: Doxy. me    HPI: Pt is a 48 y.o. male who presents for follow-up. He was concerned that the Wellbutrin and hydroxyzine might show up on a drug screen from the [facility] so he was scared to take them. He took the first 7 days of Wellbutrin and then stopped because he was told by someone else in the [employer] that he was not allowed to be on this. 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 His BP has been in the 991'A systolic during the day and the 140's in the evenings. His diastolics have been in the 80's. He had brought his cuff in to the office earlier to check against our machine and his was reading 5-10 points higher than ours. He recently had a stress test with Cardiology and his BP was normal at that visit. He has been taking the increased dose of lisinopril 20mg daily for the past 3 weeks. He had a stress test done earlier this week which was normal. He got the letter from Cardiology this morning saying the test was fine. Records personally reviewed. He did not have any chest pain or SOB during the test and achieved his goal HR. Past Medical History:   Diagnosis Date    Chest pain, unspecified 6/17/2012    Hypertension     PVC's (premature ventricular contractions) 6/17/2012       No family history on file.     Social History     Tobacco Use    Smoking status: Never Smoker    Smokeless tobacco: Never Used   Substance Use Topics    Alcohol use: Yes     Comment: Occasionally    Drug use: Not on file       ROS:  Positive only when bolded  Constitutional: HA, F/C, changes in weight  Cardiovascular: Chest pain, palpitations  Psychiatric: Anxiety    PE:  There were no vitals taken for this visit. Gen: Pt in NAD  Head: Normocephalic, atraumatic  Eyes: Sclera anicteric, EOM grossly intact  Throat: MMM  Neck: Supple  Resp: Speaking easily in full sentences without respiratory distress  Neuro: Alert, oriented, appropriate      A/P:   Encounter Diagnoses     ICD-10-CM ICD-9-CM   1. Essential hypertension  I10 401.9   2. Panic disorder  F41.0 300.01     1. Panic disorder: Discussed class of medication for Wellbutrin and hydroxyzine and that I would not expect either of these to show up positive on a standard UDS as they are not controlled substances. He would like to try the hydroxyzine prn for symptoms and will hold off on the Wellbutrin until he can see a  provider where he is deploying.  - hydrOXYzine HCL (ATARAX) 25 mg tablet; Take 1 Tab by mouth daily as needed for Anxiety. Dispense: 90 Tab; Refill: 1    2. Essential hypertension: BP reading a little high at home but home meter was reading higher than office meter at his visit and his recent BP at stress test was normal. Will have him continue to check and make a visit with new MD where he is deploying to have it checked and medication adjusted if needed. He will still need to have a BMP done in 1-2 months after starting lisinopril. RTC prn     Discussed diagnoses in detail with patient. Medication risks/benefits/side effects discussed with patient. All of the patient's questions were addressed. The patient understands and agrees with our plan of care. The patient knows to call back if they are unsure of or forget any changes we discussed today or if the symptoms change. Iam Suresh is a 48 y.o. male being evaluated by a video visit encounter for concerns as above.   A caregiver was present when appropriate. Due to this being a TeleHealth encounter (During LJWOO-01 public health emergency), evaluation of the following organ systems was limited: Vitals/Constitutional/EENT/Resp/CV/GI//MS/Neuro/Skin/Heme-Lymph-Imm. Pursuant to the emergency declaration under the [address] [place] [address] waiver authority and the [employer] Act, this Virtual  Visit was conducted, with patient's (and/or legal guardian's) consent, to reduce the patient's risk of exposure to COVID-19 and provide necessary medical care. Services were provided through a video synchronous discussion virtually to substitute for in-person clinic visit. Patient and provider were located in their home and in the office, respectively. Current Outpatient Medications on File Prior to Visit   Medication Sig Dispense Refill    lisinopriL (PRINIVIL, ZESTRIL) 20 mg tablet Take 1 Tab by mouth daily. 90 Tab 1     No current facility-administered medications on file prior to visit.

## 2022-05-19 ENCOUNTER — TELEPHONE (OUTPATIENT)
Dept: FAMILY MEDICINE CLINIC | Age: 57
End: 2022-05-19

## 2022-05-19 NOTE — TELEPHONE ENCOUNTER
Pt states on his discharge paperwork that he is to go to his PCP on 5-20 to to check (kidney) BMP levels. I do not see any labs for this, Please advise.

## 2022-05-24 ENCOUNTER — OFFICE VISIT (OUTPATIENT)
Dept: FAMILY MEDICINE CLINIC | Age: 57
End: 2022-05-24
Payer: COMMERCIAL

## 2022-05-24 VITALS
DIASTOLIC BLOOD PRESSURE: 75 MMHG | OXYGEN SATURATION: 97 % | HEIGHT: 73 IN | RESPIRATION RATE: 18 BRPM | SYSTOLIC BLOOD PRESSURE: 134 MMHG | HEART RATE: 73 BPM | TEMPERATURE: 97.2 F | WEIGHT: 247.4 LBS | BODY MASS INDEX: 32.79 KG/M2

## 2022-05-24 DIAGNOSIS — N17.9 AKI (ACUTE KIDNEY INJURY) (HCC): Primary | ICD-10-CM

## 2022-05-24 DIAGNOSIS — Z09 HOSPITAL DISCHARGE FOLLOW-UP: ICD-10-CM

## 2022-05-24 DIAGNOSIS — N20.0 NEPHROLITHIASIS: ICD-10-CM

## 2022-05-24 DIAGNOSIS — I10 ESSENTIAL HYPERTENSION: ICD-10-CM

## 2022-05-24 PROCEDURE — 1111F DSCHRG MED/CURRENT MED MERGE: CPT | Performed by: STUDENT IN AN ORGANIZED HEALTH CARE EDUCATION/TRAINING PROGRAM

## 2022-05-24 PROCEDURE — 99214 OFFICE O/P EST MOD 30 MIN: CPT | Performed by: STUDENT IN AN ORGANIZED HEALTH CARE EDUCATION/TRAINING PROGRAM

## 2022-05-24 RX ORDER — CIPROFLOXACIN 500 MG/1
TABLET ORAL
COMMUNITY
Start: 2022-05-18 | End: 2022-07-26

## 2022-05-24 RX ORDER — TRAZODONE HYDROCHLORIDE 100 MG/1
TABLET ORAL AT BEDTIME
COMMUNITY

## 2022-05-24 RX ORDER — HYDROCODONE BITARTRATE AND ACETAMINOPHEN 5; 325 MG/1; MG/1
TABLET ORAL
COMMUNITY
Start: 2022-05-18 | End: 2022-07-26

## 2022-05-24 RX ORDER — ONDANSETRON 4 MG/1
TABLET, FILM COATED ORAL
COMMUNITY
Start: 2022-05-18 | End: 2022-07-26

## 2022-05-24 RX ORDER — TAMSULOSIN HYDROCHLORIDE 0.4 MG/1
CAPSULE ORAL
COMMUNITY
Start: 2022-05-18 | End: 2022-07-26

## 2022-05-24 NOTE — PROGRESS NOTES
Arminda Anthony (: 1965) is a 55 y.o. male, established patient, here for evaluation of the following chief complaint(s):  Hospital Follow Up ([address] kidney stone 22  - did pass it while there )       Assessment/Plan:  1. SHEREEN (acute kidney injury) ([address]- Rising Cr function in setting of obstructing kidney stone. Did not require stent. Continue to encourage good PO intake. Await path from urologist. Recheck BMP today. I have independently evaluated the BMP and UA performed by the hospital during his admission.   -     METABOLIC PANEL, BASIC; Future  2. Nephrolithiasis- Follow up with Urology. Maintain good PO intake. Sounds like stone was sent for path, although this is only his first stone. Likely element of dehydration. 3. Essential hypertension- Pt self stopped previous Lisinopril. Discussed elevated SBP today and differing HTN criteria. Pt prefers to monitor this and not use any antihypertensives at this time. 4. Hospital discharge follow-up  -     SC DISCHARGE MEDS RECONCILED W/ CURRENT OUTPATIENT MED LIST       Return in about 6 months (around 2022). Subjective/Objective:  HPI  Kidney stone follow up- Seen on - for R 2.4 mm obstructing kidney stone. First time stone. CT showed hydronephrosis. An US was performed prior to discharge which showed resolution of the hydronephrosis and no more stone. Chaparrita Frias was strained and sent for path. Has completed 3 day course of Cipro and on 14 days of Flomax. SHEREEN- Cr values were increasing prior to discharge and recommended rechecking at PCP. Crea 1.14-->1.46-->1. 63. Prior labs from  were normal.        Physical Exam  Blood pressure 134/75, pulse 73, temperature 97.2 °F (36.2 °C), temperature source Oral, resp. rate 18, height 6' 1\" (1.854 m), weight 247 lb 6.4 oz (112.2 kg), SpO2 97 %. Body mass index is 32.64 kg/m². General appearance - Alert, NAD. Head - Atraumatic. Normocephalic. No lymphadenopathy  Eyes - EOMI.  Sclera white.   Respiratory - LCTAB. No wheeze/rale/rhonchi  Heart - Normal rate, regular rhythm. No m/r/r      Medical History- Reviewed Social History- Reviewed Surgical History- Reviewed   Isha Peres has a past medical history of Calculus of kidney, Chest pain, unspecified (6/17/2012), Hypertension, and PVC's (premature ventricular contractions) (6/17/2012). Carroll reports that he has never smoked. His smokeless tobacco use includes snuff. He reports current alcohol use. Isha Peres has a past surgical history that includes hx orthopaedic. Problem List- Reviewed   Isha Peres has Cough, Congestion, Eczema, Bruising, Epididymitis, Epididymitis, Poor eyesight, HTN (hypertension), Chest pain, unspecified, and PVC's (premature ventricular contractions) on their problem list.       Current Outpatient Medications   Medication Instructions    ciprofloxacin HCl (CIPRO) 500 mg tablet TAKE 1 TABLET BY MOUTH TWICE A DAY FOR 3 DAYS    HYDROcodone-acetaminophen (NORCO) 5-325 mg per tablet TAKE ONE TABLET BY MOUTH EVERY SIX HOURS AS NEEDED FOR SEVERE PAIN.  lisinopriL (PRINIVIL, ZESTRIL) 20 mg, Oral, DAILY    ondansetron hcl (ZOFRAN) 4 mg tablet TAKE 1 TABLET BY MOUTH EVERY 6 HOURS AS NEEDED FOR NAUSEA OR VOMITING FOR UP TO 7 DAYS.  tamsulosin (FLOMAX) 0.4 mg capsule TAKE 1 CAPSULE BY MOUTH DAILY FOR 14 DAYS.  traZODone (DESYREL) 100 mg tablet Oral, AT BEDTIME, Pt. Takes half. An electronic signature was used to authenticate this note.   -- Yas Parra MD

## 2022-05-24 NOTE — PROGRESS NOTES
1. Have you been to the ER, urgent care clinic since your last visit? Hospitalized since your last visit? Yes When: [address]     2. Have you seen or consulted any other health care providers outside of the [address] [place] [address] since your last visit? Include any pap smears or colon screening. Yes When: [facility]      3. For patients aged 39-70: Has the patient had a colonoscopy / FIT/ Cologuard? No    If the patient is female:    4. For patients aged 41-77: Has the patient had a mammogram within the past 2 years? NA - based on age or sex      11. For patients aged 21-65: Has the patient had a pap smear? NA - based on age or sex     Reviewed record in preparation for visit and have necessary documentation  Pt did not bring medication to office visit for review  Patient is accompanied by self I have received verbal consent from Farida Nation to discuss any/all medical information while they are present in the room.     Goals that were addressed and/or need to be completed during or after this appointment include     Health Maintenance Due   Topic Date Due    Hepatitis C Screening  Never done    COVID-19 Vaccine (1) Never done    DTaP/Tdap/Td series (1 - Tdap) Never done    Colorectal Cancer Screening Combo  Never done    Shingrix Vaccine Age 50> (1 of 2) Never done

## 2022-05-25 LAB
ANION GAP SERPL CALC-SCNC: 6 MMOL/L (ref 5–15)
BUN SERPL-MCNC: 14 MG/DL (ref 6–20)
BUN/CREAT SERPL: 15 (ref 12–20)
CALCIUM SERPL-MCNC: 8.3 MG/DL (ref 8.5–10.1)
CHLORIDE SERPL-SCNC: 109 MMOL/L (ref 97–108)
CO2 SERPL-SCNC: 27 MMOL/L (ref 21–32)
CREAT SERPL-MCNC: 0.94 MG/DL (ref 0.7–1.3)
GLUCOSE SERPL-MCNC: 96 MG/DL (ref 65–100)
POTASSIUM SERPL-SCNC: 3.9 MMOL/L (ref 3.5–5.1)
SODIUM SERPL-SCNC: 142 MMOL/L (ref 136–145)

## 2022-05-25 NOTE — PROGRESS NOTES
Your kidney function has returned back down to normal from where it was elevated at the hospital with your kidney stone. No further testing required at this time.

## 2022-07-26 ENCOUNTER — OFFICE VISIT (OUTPATIENT)
Dept: FAMILY MEDICINE CLINIC | Age: 57
End: 2022-07-26
Payer: COMMERCIAL

## 2022-07-26 VITALS
DIASTOLIC BLOOD PRESSURE: 75 MMHG | HEART RATE: 64 BPM | RESPIRATION RATE: 18 BRPM | HEIGHT: 72 IN | OXYGEN SATURATION: 96 % | TEMPERATURE: 98.1 F | WEIGHT: 227.6 LBS | SYSTOLIC BLOOD PRESSURE: 125 MMHG | BODY MASS INDEX: 30.83 KG/M2

## 2022-07-26 DIAGNOSIS — U07.1 COVID-19: Primary | ICD-10-CM

## 2022-07-26 DIAGNOSIS — Z72.0 TOBACCO USE: ICD-10-CM

## 2022-07-26 PROCEDURE — 99213 OFFICE O/P EST LOW 20 MIN: CPT | Performed by: FAMILY MEDICINE

## 2022-07-26 NOTE — LETTER
NOTIFICATION RETURN TO WORK     7/26/2022 4:02 PM    Mr. Bruna [address] [address]      To Whom It May Concern:    Joanagorge Rader is currently under the care of Willie Banuelos. He will return to work in 3-5 days with resolution of symptoms. If there are questions or concerns please have the patient contact our office.         Sincerely,      Radha Machuca MD

## 2022-07-26 NOTE — PROGRESS NOTES
1. \"Have you been to the ER, urgent care clinic since your last visit? Hospitalized since your last visit? \" NO    2. \"Have you seen or consulted any other health care providers outside of the [address] [place] [address] since your last visit? \" No     3. For patients aged 39-70: Has the patient had a colonoscopy / FIT/ Cologuard? No    If the patient is female:    4. For patients aged 41-77: Has the patient had a mammogram within the past 2 years? NA - based on age or sex    11. For patients aged 21-65: Has the patient had a pap smear? NA - based on age or sex     Patient is accompanied by self I have received verbal consent from Horacio Chapman to discuss any/all medical information while they are present in the room.   Reviewed record in preparation for visit and have necessary documentation  Goals that were addressed and/or need to be completed during or after this appointment include     Health Maintenance Due   Topic Date Due    Hepatitis C Screening  Never done    COVID-19 Vaccine (1) Never done    DTaP/Tdap/Td series (1 - Tdap) Never done    Colorectal Cancer Screening Combo  Never done    Shingrix Vaccine Age 50> (1 of 2) Never done       Colonoscopy scheduled 9-7-22  [facility]

## 2022-07-28 LAB
SARS-COV-2, NAA 2 DAY TAT: NORMAL
SARS-COV-2, NAA: NOT DETECTED

## 2022-07-28 NOTE — PROGRESS NOTES
Patient: Marquis Betancourt MRN: [id number]  SSN: [id number]    YOB: 1965  Age: 55 y.o. Sex: male      Chief Complaint   Patient presents with    Cold Symptoms     Marquis Betancourt is a 55 y.o. male presents with complaints of congestion, dry cough, myalgias, chills, and malaise for 3 days. There has been nausea and vomiting . Symptoms are moderate. Patient is drinking plenty of fluids. There is a hx of tobacco use. Medications:     Current Outpatient Medications   Medication Sig    traZODone (DESYREL) 100 mg tablet Take  by mouth At bedtime. Pt. Takes half. No current facility-administered medications for this visit. Problem List:     Patient Active Problem List    Diagnosis Date Noted    Chest pain, unspecified 06/17/2012    PVC's (premature ventricular contractions) 06/17/2012    HTN (hypertension) 04/12/2012    Poor eyesight 01/06/2012    Epididymitis 06/08/2011    Epididymitis 06/08/2011    Bruising 05/12/2011    Eczema 01/27/2011    Cough 01/07/2011    Congestion 01/07/2011       Medical History:     Past Medical History:   Diagnosis Date    Calculus of kidney     Chest pain, unspecified 6/17/2012    Hypertension     PVC's (premature ventricular contractions) 6/17/2012       Allergies:      Allergies   Allergen Reactions    Doxycycline Hives       Surgical History:     Past Surgical History:   Procedure Laterality Date    HX ORTHOPAEDIC         Social History:     Social History     Socioeconomic History    Marital status:    Tobacco Use    Smoking status: Never    Smokeless tobacco: Current     Types: Snuff   Substance and Sexual Activity    Alcohol use: Yes     Comment: Occasionally    Sexual activity: Yes     Social Determinants of Health     Financial Resource Strain: Medium Risk    Difficulty of Paying Living Expenses: Somewhat hard   Food Insecurity: No Food Insecurity    Worried About Running Out of Food in the Last Year: Never true    Ran Out of Food in the Last Year: Never true       Review of Symptoms:  Constitutional: c/o malaise, chills  Skin: Negative for rash or lesion  Head: Negative for facial swelling or tenderness  Eyes: Negative for redness or discharge  Ears: Negative for otalgia or decreased hearing  Nose: c/o nasal congestion, denies sinus pressure  Neck: c/o sore throat, denies lymphadenopathy   Cardiovascular: Negative for chest pain or palpitations  Respiratory: c/o non-productive cough, denies wheezing or SOB  Gastrointestinal: Negative for nausea or abdominal pain  Neurologic: Negative for headache or dizziness      Visit Vitals  /75 (BP 1 Location: Left upper arm, BP Patient Position: Sitting, BP Cuff Size: Large adult)   Pulse 64   Temp 98.1 °F (36.7 °C) (Oral)   Resp 18   Ht 6' (1.829 m)   Wt 227 lb 9.6 oz (103.2 kg)   SpO2 96%   BMI 30.87 kg/m²       Physical Examination:  General: Appears ill, not toxic  Skin: Warm and dry sans rash or lesion  Head: Normocephalic, atraumatic  Eyes: Sclera clear, EOMI  Neck: Normal range of motion, no lymphadenopathy  Cardiovascular: normal S1, S2, regular rate and rhythm  Respiratory: Clear to auscultation bilaterally with symmetrical, unlabored effort  Extremities: Full range of motion  Neurologic: Active, alert and oriented      Diagnoses and all orders for this visit:    1. COVID-19  -     NOVEL CORONAVIRUS (COVID-19)        Plan of Care:  Symptomatic therapy suggested: rest, increase fluids, gargle prn for sore throat, and call prn if symptoms persist or worsen. Diagnoses were discussed in detail with patient. Advised patient that upper respiratory symptoms can last 1-2 weeks and a cough can persist beyond that time. Medication risks/benefits/side effects discussed with patient. All of the patient's questions were addressed and answered to apparent satisfaction. The patient understands and agrees with our plan of care.   The patient knows to call back if they have questions about the plan of care or if symptoms change. The patient received an After-Visit Summary which contains VS, diagnoses, orders, allergy and medication lists. No future appointments.

## 2022-11-02 ENCOUNTER — TELEPHONE (OUTPATIENT)
Dept: FAMILY MEDICINE CLINIC | Age: 57
End: 2022-11-02

## 2022-11-02 NOTE — TELEPHONE ENCOUNTER
Pt will need an appt to be evaluated for symptoms  Appears to be open appt on 11-4-22  Please schedule with first available

## 2022-11-02 NOTE — TELEPHONE ENCOUNTER
Pt has headaches, chills,nose runny, dry cough and sore throat. 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 Please call pt to help with a appointment this week.

## 2022-11-04 ENCOUNTER — OFFICE VISIT (OUTPATIENT)
Dept: FAMILY MEDICINE CLINIC | Age: 57
End: 2022-11-04
Payer: COMMERCIAL

## 2022-11-04 VITALS
DIASTOLIC BLOOD PRESSURE: 69 MMHG | OXYGEN SATURATION: 97 % | HEIGHT: 72 IN | RESPIRATION RATE: 20 BRPM | BODY MASS INDEX: 30.48 KG/M2 | TEMPERATURE: 97.5 F | WEIGHT: 225 LBS | SYSTOLIC BLOOD PRESSURE: 129 MMHG | HEART RATE: 80 BPM

## 2022-11-04 DIAGNOSIS — R68.89 FLU-LIKE SYMPTOMS: Primary | ICD-10-CM

## 2022-11-04 DIAGNOSIS — R05.1 ACUTE COUGH: ICD-10-CM

## 2022-11-04 LAB
QUICKVUE INFLUENZA TEST: NEGATIVE
VALID INTERNAL CONTROL?: YES

## 2022-11-04 PROCEDURE — 3074F SYST BP LT 130 MM HG: CPT | Performed by: STUDENT IN AN ORGANIZED HEALTH CARE EDUCATION/TRAINING PROGRAM

## 2022-11-04 PROCEDURE — 3078F DIAST BP <80 MM HG: CPT | Performed by: STUDENT IN AN ORGANIZED HEALTH CARE EDUCATION/TRAINING PROGRAM

## 2022-11-04 PROCEDURE — 87804 INFLUENZA ASSAY W/OPTIC: CPT | Performed by: STUDENT IN AN ORGANIZED HEALTH CARE EDUCATION/TRAINING PROGRAM

## 2022-11-04 PROCEDURE — 99213 OFFICE O/P EST LOW 20 MIN: CPT | Performed by: STUDENT IN AN ORGANIZED HEALTH CARE EDUCATION/TRAINING PROGRAM

## 2022-11-04 RX ORDER — IBUPROFEN 600 MG/1
600 TABLET ORAL
Qty: 30 TABLET | Refills: 0 | Status: SHIPPED | OUTPATIENT
Start: 2022-11-04

## 2022-11-04 RX ORDER — DEXTROMETHORPHAN POLISTIREX 30 MG/5ML
60 SUSPENSION ORAL
Qty: 140 ML | Refills: 0 | Status: SHIPPED | OUTPATIENT
Start: 2022-11-04 | End: 2022-11-11

## 2022-11-04 RX ORDER — ACETAMINOPHEN 500 MG
500 TABLET ORAL
Qty: 30 TABLET | Refills: 0 | Status: SHIPPED | OUTPATIENT
Start: 2022-11-04

## 2022-11-04 RX ORDER — ESCITALOPRAM OXALATE 10 MG/1
15 TABLET ORAL DAILY
COMMUNITY

## 2022-11-04 NOTE — PROGRESS NOTES
[address] [facility] [address] [facility], [facility] [address]  P ([phone])  Date of visit:  11/4/2022    Yarelis Miller is a 65 y.o. male who presents to the clinic due to cough, headache, chills, runny nose. Started 2 days ago. Dry cough, all day and all night, interferes with his sleep. His lower ribs feels sore when he coughs. Took cold and flu medication, it helped with the rhinorrhea. Pt has not been tested for Covid. Review of Systems   Constitutional:  Positive for chills and malaise/fatigue. Negative for fever. HENT:  Negative for ear pain. Scratchy throat. Eyes:  Negative for redness. Respiratory:  Positive for cough. Negative for sputum production and shortness of breath. Cardiovascular:  Negative for chest pain. Chest tightness (subsided). Gastrointestinal:  Positive for diarrhea and nausea. Negative for abdominal pain and vomiting. 5-6 times a day for diarrhea. Loose, brown diarrhea. No blood. Neurological:  Positive for headaches. Negative for dizziness. Allergies   Allergies   Allergen Reactions    Doxycycline Hives       Medications  Current Outpatient Medications   Medication Sig    escitalopram oxalate (Lexapro) 10 mg tablet Take 15 mg by mouth daily. dextromethorphan (Delsym) 30 mg/5 mL liquid Take 10 mL by mouth every twelve (12) hours as needed for Cough for up to 7 days. ipratropium (ATROVENT HFA) 17 mcg/actuation inhaler Take 2 Puffs by inhalation three (3) times daily for 7 days. acetaminophen (TYLENOL) 500 mg tablet Take 1 Tablet by mouth every six (6) hours as needed for Pain. ibuprofen (MOTRIN) 600 mg tablet Take 1 Tablet by mouth every six (6) hours as needed for Pain. traZODone (DESYREL) 100 mg tablet Take  by mouth At bedtime. Pt. Takes half. No current facility-administered medications for this visit.        Medical History  Past Medical History:   Diagnosis Date    Calculus of kidney Chest pain, unspecified 6/17/2012    Hypertension     PVC's (premature ventricular contractions) 6/17/2012       Immunizations   Immunization History   Administered Date(s) Administered    Influenza Vaccine 11/15/2019       Social History  Social History     Tobacco Use    Smoking status: Never    Smokeless tobacco: Current     Types: Snuff   Substance Use Topics    Alcohol use: Yes     Comment: Occasionally       Objective   Visit Vitals  /69 (BP 1 Location: Left upper arm, BP Patient Position: Sitting, BP Cuff Size: Adult)   Pulse 80   Temp 97.5 °F (36.4 °C) (Oral)   Resp 20   Ht 6' (1.829 m)   Wt 225 lb (102.1 kg)   SpO2 97%   BMI 30.52 kg/m²         Physical Exam  Constitutional:       General: He is not in acute distress. Appearance: He is not ill-appearing, toxic-appearing or diaphoretic. Comments: Coughing. HENT:      Head: Normocephalic and atraumatic. Cardiovascular:      Rate and Rhythm: Normal rate and regular rhythm. Pulmonary:      Effort: Pulmonary effort is normal. No respiratory distress. Breath sounds: Normal breath sounds. No stridor. No wheezing, rhonchi or rales. Comments: Tenderness in anterior aspect of last rib on the left side. Abdominal:      General: Bowel sounds are normal. There is no distension. Palpations: Abdomen is soft. Tenderness: There is no guarding or rebound. Neurological:      Mental Status: He is alert. Jassi Mauricio is a 65 y.o. male who presents to the clinic for flu like symptoms, includes dry cough, rhinorrhea, chills, headache. Soreness in the ribs due to cough. Rapid flu test is negative. Vitals are stable. Saturating adequately on room air. Lungs sound clear. Symptom likely due to viral illness. - AMB POC RAPID INFLUENZA TEST  - NOVEL CORONAVIRUS (COVID-19)  - dextromethorphan (Delsym) 30 mg/5 mL liquid; Take 10 mL by mouth every twelve (12) hours as needed for Cough for up to 7 days.   Dispense: 140 mL; Refill: 0  - ipratropium (ATROVENT HFA) 17 mcg/actuation inhaler; Take 2 Puffs by inhalation three (3) times daily for 7 days. Dispense: 12.9 g; Refill: 0  - acetaminophen (TYLENOL) 500 mg tablet; Take 1 Tablet by mouth every six (6) hours as needed for Pain. Dispense: 30 Tablet; Refill: 0  - ibuprofen (MOTRIN) 600 mg tablet; Take 1 Tablet by mouth every six (6) hours as needed for Pain. Dispense: 30 Tablet; Refill: 0      Follow-up and Dispositions    Return in about 2 weeks (around 11/18/2022) for Follow up on Flu like symptoms.          Signed By:  Kalpana Cruz MD    Family Medicine Resident

## 2022-11-04 NOTE — PROGRESS NOTES
1. \"Have you been to the ER, urgent care clinic since your last visit? Hospitalized since your last visit? \" No    2. \"Have you seen or consulted any other health care providers outside of the [address] [place] [address] since your last visit? \" No     3. For patients aged 39-70: Has the patient had a colonoscopy / FIT/ Cologuard? Yes - Care Gap present. Rooming MA/LPN to request most recent results Fish        If the patient is female:    4. For patients aged 41-77: Has the patient had a mammogram within the past 2 years? NA - based on age or sex      11. For patients aged 21-65: Has the patient had a pap smear?  NA - based on age or sex    Health Maintenance Due   Topic Date Due    Hepatitis C Screening  Never done    COVID-19 Vaccine (1) Never done    DTaP/Tdap/Td series (1 - Tdap) Never done    Colorectal Cancer Screening Combo  Never done    Shingrix Vaccine Age 50> (1 of 2) Never done    Flu Vaccine (1) 08/01/2022

## 2022-11-04 NOTE — LETTER
NOTIFICATION RETURN TO WORK    11/4/2022 9:19 AM    . [place] [address] [address]      To Whom It May Concern:    Steve Young [place] is currently under the care of Willie Banuelos. Please excuse Kaitlin Miles on the days below:  11/5/2022 - 11/6/2022     He will return to work on: 11/7/2022    If there are questions or concerns please have the patient contact our office.       Sincerely,      Chapo Tirado MD

## 2022-11-06 LAB
SARS-COV-2, NAA 2 DAY TAT: NORMAL
SARS-COV-2, NAA: DETECTED

## 2023-10-31 NOTE — PROGRESS NOTES
CC: BP check    HPI: Pt is a 48 y.o. male who presents for BP check. He was started on lisinopril 10mg daily at his last visit one month ago. He reports at home his values have been 130-140's/80's and has his BP machine here with the logs. He denies HA, blurry vision and leg swelling. He also feels like he has been suffering with anxiety. He has episodes of heart racing and feeling very anxious. 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 He has never had anxiety in the past and does not feel like he is anxious at baseline. He has labs from a  physical that show BG of 129. Pt states he was fasting at that time. His TG on those labs was 202, improved from the 406 done here after he had eaten. He is inquiring about options for weight loss. Past Medical History:   Diagnosis Date    Chest pain, unspecified 6/17/2012    Hypertension     PVC's (premature ventricular contractions) 6/17/2012       No family history on file. Social History     Tobacco Use    Smoking status: Never Smoker    Smokeless tobacco: Never Used   Substance Use Topics    Alcohol use: Yes     Comment: Occasionally    Drug use: Not on file       ROS:  Per HPI    PE:  Visit Vitals  /86   Pulse 78   Temp 97.3 °F (36.3 °C) (Temporal)   Resp 15   Ht 6' 1\" (1.854 m)   Wt 243 lb (110.2 kg)   SpO2 97%   BMI 32.06 kg/m²     Gen: Pt sitting in chair, in NAD  Head: Normocephalic, atraumatic  Eyes: Sclera anicteric, EOM grossly intact, PERRL  Throat: Mask in place  Neck: Supple, no carotid bruits  CVS: Normal S1, S2, no m/r/g  Resp: CTAB, no wheezes or rales  Extrem: Atraumatic, no cyanosis or edema  Pulses: 2+   Skin: Warm, dry  Neuro: Alert, oriented, appropriate      A/P:   Encounter Diagnoses     ICD-10-CM ICD-9-CM   1. Panic disorder  F41.0 300.01   2. Essential hypertension  I10 401.9   3.  Class 1 obesity with serious comorbidity and body mass index Patient declined referral to GI for colonoscopy and declined Hemoccult stool test. (BMI) of 32.0 to 32.9 in adult, unspecified obesity type  E66.9 278.00    Z68.32 V85.32   4. Pre-diabetes  R73.03 790.29     1. Panic disorder: Discussed options with patient including behavioral therapy, psychology referral and medications. 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 He is interested in having a medication to take when he gets a panic attack. Discussed side effects of this including drowsiness and advised he try it for the first time before bed to see how it affects him. He should not operate heavy machinery while taking this medication. As pt is also interested in weight loss, offered option of Wellbutrin to help with anxiety as well as weight loss and he would like to try that. He has never had a seizure. - hydrOXYzine HCL (ATARAX) 25 mg tablet; Take 1 Tab by mouth daily as needed for Anxiety. Dispense: 90 Tab; Refill: 1  - buPROPion XL (WELLBUTRIN XL) 150 mg tablet; Take 1 Tab by mouth every morning for 7 days. Dispense: 7 Tab; Refill: 0  - buPROPion XL (WELLBUTRIN XL) 300 mg XL tablet; Take 1 Tab by mouth every morning. Start taking after you finish the 150mg tabs. Dispense: 90 Tab; Refill: 1    2. Essential hypertension: Will increase lisinopril to 20mg daily. He will continue to check BP daily and call if it remains >140/90. Pt advised that he will need to have his kidney function checked in 2 months.   - lisinopriL (PRINIVIL, ZESTRIL) 20 mg tablet; Take 1 Tab by mouth daily. Dispense: 90 Tab; Refill: 1    3. Class 1 obesity with serious comorbidity and body mass index (BMI) of 32.0 to 32.9 in adult, unspecified obesity type  - Handout given on keto diet and pt started on Wellbutrin, as above. 4. Pre-diabetes: POC A1c 5.8. Pt advised on low carb/keto diet and that he should have the A1c checked yearly. RTC prn if symptoms worsen or fail to improve    Discussed diagnoses in detail with patient.    Medication risks/benefits/side effects discussed with patient. All of the patient's questions were addressed. The patient understands and agrees with our plan of care. The patient knows to call back if they are unsure of or forget any changes we discussed today or if the symptoms change. The patient received an After-Visit Summary which contains VS, orders, medication list and allergy list. This can be used as a \"mini-medical record\" should they have to seek medical care while out of town. Current Outpatient Medications on File Prior to Visit   Medication Sig Dispense Refill    diclofenac EC (VOLTAREN) 75 mg EC tablet TAKE 1 TABLET BY MOUTH TWICE A DAY      meclizine (ANTIVERT) 25 mg tablet Take 1 Tab by mouth three (3) times daily as needed for Dizziness. 60 Tab 0     No current facility-administered medications on file prior to visit.

## 2024-05-16 ENCOUNTER — OFFICE VISIT (OUTPATIENT)
Facility: CLINIC | Age: 59
End: 2024-05-16
Payer: COMMERCIAL

## 2024-05-16 VITALS
HEART RATE: 65 BPM | RESPIRATION RATE: 20 BRPM | BODY MASS INDEX: 35.08 KG/M2 | SYSTOLIC BLOOD PRESSURE: 143 MMHG | DIASTOLIC BLOOD PRESSURE: 80 MMHG | HEIGHT: 72 IN | OXYGEN SATURATION: 97 % | WEIGHT: 259 LBS | TEMPERATURE: 98.4 F

## 2024-05-16 DIAGNOSIS — F41.9 ANXIETY: ICD-10-CM

## 2024-05-16 DIAGNOSIS — I10 ESSENTIAL (PRIMARY) HYPERTENSION: Primary | ICD-10-CM

## 2024-05-16 DIAGNOSIS — E66.09 CLASS 2 OBESITY DUE TO EXCESS CALORIES WITHOUT SERIOUS COMORBIDITY WITH BODY MASS INDEX (BMI) OF 35.0 TO 35.9 IN ADULT: ICD-10-CM

## 2024-05-16 DIAGNOSIS — R07.89 CHEST PRESSURE: ICD-10-CM

## 2024-05-16 DIAGNOSIS — Z11.59 ENCOUNTER FOR HEPATITIS C SCREENING TEST FOR LOW RISK PATIENT: ICD-10-CM

## 2024-05-16 DIAGNOSIS — Z11.4 SCREENING FOR HIV WITHOUT PRESENCE OF RISK FACTORS: ICD-10-CM

## 2024-05-16 PROCEDURE — 99214 OFFICE O/P EST MOD 30 MIN: CPT | Performed by: STUDENT IN AN ORGANIZED HEALTH CARE EDUCATION/TRAINING PROGRAM

## 2024-05-16 PROCEDURE — 3077F SYST BP >= 140 MM HG: CPT | Performed by: STUDENT IN AN ORGANIZED HEALTH CARE EDUCATION/TRAINING PROGRAM

## 2024-05-16 PROCEDURE — 3079F DIAST BP 80-89 MM HG: CPT | Performed by: STUDENT IN AN ORGANIZED HEALTH CARE EDUCATION/TRAINING PROGRAM

## 2024-05-16 PROCEDURE — 93000 ELECTROCARDIOGRAM COMPLETE: CPT | Performed by: STUDENT IN AN ORGANIZED HEALTH CARE EDUCATION/TRAINING PROGRAM

## 2024-05-16 RX ORDER — AMLODIPINE BESYLATE 5 MG/1
5 TABLET ORAL DAILY
Qty: 30 TABLET | Refills: 0 | Status: SHIPPED | OUTPATIENT
Start: 2024-05-16

## 2024-05-16 RX ORDER — FLUTICASONE PROPIONATE 50 MCG
SPRAY, SUSPENSION (ML) NASAL
COMMUNITY
Start: 2024-04-25

## 2024-05-16 RX ORDER — TADALAFIL 20 MG/1
TABLET ORAL
COMMUNITY
Start: 2024-03-27

## 2024-05-16 RX ORDER — CHOLECALCIFEROL (VITAMIN D3) 50 MCG
TABLET ORAL
COMMUNITY
Start: 2024-04-25

## 2024-05-16 RX ORDER — FEXOFENADINE HCL 180 MG/1
TABLET ORAL
COMMUNITY
Start: 2024-04-25

## 2024-05-16 SDOH — ECONOMIC STABILITY: INCOME INSECURITY: HOW HARD IS IT FOR YOU TO PAY FOR THE VERY BASICS LIKE FOOD, HOUSING, MEDICAL CARE, AND HEATING?: SOMEWHAT HARD

## 2024-05-16 SDOH — ECONOMIC STABILITY: HOUSING INSECURITY
IN THE LAST 12 MONTHS, WAS THERE A TIME WHEN YOU DID NOT HAVE A STEADY PLACE TO SLEEP OR SLEPT IN A SHELTER (INCLUDING NOW)?: NO

## 2024-05-16 SDOH — ECONOMIC STABILITY: FOOD INSECURITY: WITHIN THE PAST 12 MONTHS, YOU WORRIED THAT YOUR FOOD WOULD RUN OUT BEFORE YOU GOT MONEY TO BUY MORE.: NEVER TRUE

## 2024-05-16 SDOH — ECONOMIC STABILITY: FOOD INSECURITY: WITHIN THE PAST 12 MONTHS, THE FOOD YOU BOUGHT JUST DIDN'T LAST AND YOU DIDN'T HAVE MONEY TO GET MORE.: NEVER TRUE

## 2024-05-17 DIAGNOSIS — K76.0 HEPATIC STEATOSIS: Primary | ICD-10-CM

## 2024-05-17 DIAGNOSIS — E78.2 MIXED HYPERLIPIDEMIA: ICD-10-CM

## 2024-05-17 PROBLEM — E66.09 CLASS 1 OBESITY DUE TO EXCESS CALORIES WITHOUT SERIOUS COMORBIDITY WITH BODY MASS INDEX (BMI) OF 30.0 TO 30.9 IN ADULT: Status: ACTIVE | Noted: 2024-05-17

## 2024-05-17 PROBLEM — E66.811 CLASS 1 OBESITY DUE TO EXCESS CALORIES WITHOUT SERIOUS COMORBIDITY WITH BODY MASS INDEX (BMI) OF 30.0 TO 30.9 IN ADULT: Status: ACTIVE | Noted: 2024-05-17

## 2024-05-17 PROBLEM — F41.9 ANXIETY: Status: ACTIVE | Noted: 2024-05-17

## 2024-05-17 LAB
ALBUMIN SERPL-MCNC: 4.1 G/DL (ref 3.5–5)
ALBUMIN/GLOB SERPL: 1.4 (ref 1.1–2.2)
ALP SERPL-CCNC: 63 U/L (ref 45–117)
ALT SERPL-CCNC: 53 U/L (ref 12–78)
ANION GAP SERPL CALC-SCNC: 5 MMOL/L (ref 5–15)
AST SERPL-CCNC: 43 U/L (ref 15–37)
BILIRUB SERPL-MCNC: 0.4 MG/DL (ref 0.2–1)
BUN SERPL-MCNC: 14 MG/DL (ref 6–20)
BUN/CREAT SERPL: 15 (ref 12–20)
CALCIUM SERPL-MCNC: 9.2 MG/DL (ref 8.5–10.1)
CHLORIDE SERPL-SCNC: 110 MMOL/L (ref 97–108)
CHOLEST SERPL-MCNC: 194 MG/DL
CO2 SERPL-SCNC: 27 MMOL/L (ref 21–32)
CREAT SERPL-MCNC: 0.95 MG/DL (ref 0.7–1.3)
ERYTHROCYTE [DISTWIDTH] IN BLOOD BY AUTOMATED COUNT: 13.6 % (ref 11.5–14.5)
EST. AVERAGE GLUCOSE BLD GHB EST-MCNC: 128 MG/DL
GLOBULIN SER CALC-MCNC: 2.9 G/DL (ref 2–4)
GLUCOSE SERPL-MCNC: 104 MG/DL (ref 65–100)
HBA1C MFR BLD: 6.1 % (ref 4–5.6)
HCT VFR BLD AUTO: 46.4 % (ref 36.6–50.3)
HCV AB SER IA-ACNC: 0.03 INDEX
HCV AB SERPL QL IA: NONREACTIVE
HDLC SERPL-MCNC: 33 MG/DL
HDLC SERPL: 5.9 (ref 0–5)
HGB BLD-MCNC: 15.1 G/DL (ref 12.1–17)
HIV 1+2 AB+HIV1 P24 AG SERPL QL IA: NONREACTIVE
HIV 1/2 RESULT COMMENT: NORMAL
LDLC SERPL CALC-MCNC: ABNORMAL MG/DL (ref 0–100)
LDLC SERPL DIRECT ASSAY-MCNC: 104 MG/DL (ref 0–100)
MCH RBC QN AUTO: 30.4 PG (ref 26–34)
MCHC RBC AUTO-ENTMCNC: 32.5 G/DL (ref 30–36.5)
MCV RBC AUTO: 93.4 FL (ref 80–99)
NRBC # BLD: 0 K/UL (ref 0–0.01)
NRBC BLD-RTO: 0 PER 100 WBC
PLATELET # BLD AUTO: 238 K/UL (ref 150–400)
PMV BLD AUTO: 9.7 FL (ref 8.9–12.9)
POTASSIUM SERPL-SCNC: 4.4 MMOL/L (ref 3.5–5.1)
PROT SERPL-MCNC: 7 G/DL (ref 6.4–8.2)
RBC # BLD AUTO: 4.97 M/UL (ref 4.1–5.7)
SODIUM SERPL-SCNC: 142 MMOL/L (ref 136–145)
TRIGL SERPL-MCNC: 656 MG/DL
VLDLC SERPL CALC-MCNC: ABNORMAL MG/DL
WBC # BLD AUTO: 5.4 K/UL (ref 4.1–11.1)

## 2024-05-17 RX ORDER — ROSUVASTATIN CALCIUM 10 MG/1
10 TABLET, COATED ORAL NIGHTLY
Qty: 90 TABLET | Refills: 0 | Status: SHIPPED | OUTPATIENT
Start: 2024-05-17

## 2024-05-17 NOTE — RESULT ENCOUNTER NOTE
The 10-year ASCVD risk score (Geo URIARTE, et al., 2019) is: 12.7%    Values used to calculate the score:      Age: 58 years      Sex: Male      Is Non- : No      Diabetic: No      Tobacco smoker: No      Systolic Blood Pressure: 143 mmHg      Is BP treated: Yes      HDL Cholesterol: 33 MG/DL      Total Cholesterol: 194 MG/DL    10-yr ASCVD risk warrants use of a moderate-intensity statin. Non-fasting TG level significantly elevated. Initiate statin as above and recommend recheck with patient fasting for at least 12 hours. Recommend decreasing alcohol intake as much as possible. Mildly elevated AST in s/o alcohol use, other LFTs wnl. However, FIB-4 index warrants further investigation to rule out fibrosis in the setting of suspected fatty liver disease due to a combination of excess alcohol intake and triglyceride level. Patient also has prediabetes. Other labs unremarkable.

## 2024-05-20 NOTE — PROGRESS NOTES
I saw and evaluated the patient, performing the key elements of the service on the date of service.  I discussed the findings, assessment and plan with the resident and agree with the resident's findings and plan as documented in the resident's note.     Priti Luu MD 5/20/2024   
    Return in about 2 weeks (around 5/30/2024) for follow up BP..     Subjective:   HPI:  Luciano Mcgowan Jr is a 58 y.o. male that presents for:    Blood pressure check. Patient was feeling dizzy and \"funny\" today and had his BP checked and was 160/84. Usually is high/normal, in the 130s/70s. Also reporting some chest pressure on the left side. The pain comes and goes and is not exacerbated or relieved by exertion or rest, respectively. States he mostly feels it after coughing. Patient does have a history of anxiety from PTSD and sees a counselor for this. Cough has been present for a few days. Nonproductive. Has tickle in throat. No other specific complaints, but does report that he has significant arthritis in his knees which limits him from exercising much.     Health Maintenance:  Health Maintenance Due   Topic Date Due    Hepatitis B vaccine (1 of 3 - 3-dose series) Never done    COVID-19 Vaccine (1) Never done    DTaP/Tdap/Td vaccine (1 - Tdap) Never done    Colorectal Cancer Screen  Never done    Shingles vaccine (1 of 2) Never done    Depression Screen  07/26/2023        Review of Systems  Per HPI.      Past medical history, social history, and medications personally reviewed.  Past Medical History:   Diagnosis Date    Calculus of kidney     Chest pain, unspecified 6/17/2012    Hypertension     PVC's (premature ventricular contractions) 6/17/2012        Allergies personally reviewed.  Allergies   Allergen Reactions    Doxycycline Hives          Objective:   Vitals reviewed.  BP (!) 143/80   Pulse 65   Temp 98.4 °F (36.9 °C)   Resp 20   Ht 1.829 m (6')   Wt 117.5 kg (259 lb)   SpO2 97%   BMI 35.13 kg/m²      Wt Readings from Last 3 Encounters:   05/16/24 117.5 kg (259 lb)   11/04/22 102.1 kg (225 lb)   07/26/22 103.2 kg (227 lb 9.6 oz)        Physical Exam  Constitutional:       Appearance: Normal appearance.   Neck:      Vascular: No carotid bruit.   Cardiovascular:      Rate and Rhythm: Normal rate

## 2024-05-24 ENCOUNTER — TELEPHONE (OUTPATIENT)
Facility: CLINIC | Age: 59
End: 2024-05-24

## 2024-05-24 NOTE — TELEPHONE ENCOUNTER
Pt states he missed a call from [facility] for his US referral. Pt tried to call back, but only got the answering service. Pt would like for someone to reach out to [facility] and have them call him back so he can set up the appt. Please advise.

## 2024-05-28 DIAGNOSIS — E66.09 CLASS 1 OBESITY DUE TO EXCESS CALORIES WITHOUT SERIOUS COMORBIDITY WITH BODY MASS INDEX (BMI) OF 30.0 TO 30.9 IN ADULT: ICD-10-CM

## 2024-05-28 DIAGNOSIS — I10 ESSENTIAL (PRIMARY) HYPERTENSION: ICD-10-CM

## 2024-05-28 DIAGNOSIS — I10 ESSENTIAL (PRIMARY) HYPERTENSION: Primary | ICD-10-CM

## 2024-05-28 LAB
COMMENT:: NORMAL
SPECIMEN HOLD: NORMAL

## 2024-05-29 LAB
ALBUMIN SERPL-MCNC: 4.1 G/DL (ref 3.5–5)
ALBUMIN/GLOB SERPL: 1.5 (ref 1.1–2.2)
ALP SERPL-CCNC: 63 U/L (ref 45–117)
ALT SERPL-CCNC: 54 U/L (ref 12–78)
AST SERPL-CCNC: 36 U/L (ref 15–37)
BILIRUB DIRECT SERPL-MCNC: 0.1 MG/DL (ref 0–0.2)
BILIRUB SERPL-MCNC: 0.5 MG/DL (ref 0.2–1)
CHOLEST SERPL-MCNC: 126 MG/DL
GLOBULIN SER CALC-MCNC: 2.8 G/DL (ref 2–4)
HDLC SERPL-MCNC: 34 MG/DL
HDLC SERPL: 3.7 (ref 0–5)
LDLC SERPL CALC-MCNC: 51.8 MG/DL (ref 0–100)
PROT SERPL-MCNC: 6.9 G/DL (ref 6.4–8.2)
TRIGL SERPL-MCNC: 201 MG/DL
VLDLC SERPL CALC-MCNC: 40.2 MG/DL

## 2024-05-29 NOTE — RESULT ENCOUNTER NOTE
Fasting labs much better than non-fasting. TG still elevated, though. LDL cholesterol looks great, HDL needs improvement. LFT's are normal.

## 2024-05-30 ENCOUNTER — TELEPHONE (OUTPATIENT)
Facility: CLINIC | Age: 59
End: 2024-05-30

## 2024-05-30 ENCOUNTER — OFFICE VISIT (OUTPATIENT)
Facility: CLINIC | Age: 59
End: 2024-05-30

## 2024-05-30 VITALS
DIASTOLIC BLOOD PRESSURE: 83 MMHG | OXYGEN SATURATION: 99 % | WEIGHT: 257 LBS | SYSTOLIC BLOOD PRESSURE: 141 MMHG | RESPIRATION RATE: 20 BRPM | HEIGHT: 72 IN | HEART RATE: 88 BPM | BODY MASS INDEX: 34.81 KG/M2 | TEMPERATURE: 97.8 F

## 2024-05-30 DIAGNOSIS — E66.09 CLASS 1 OBESITY DUE TO EXCESS CALORIES WITHOUT SERIOUS COMORBIDITY WITH BODY MASS INDEX (BMI) OF 30.0 TO 30.9 IN ADULT: ICD-10-CM

## 2024-05-30 DIAGNOSIS — J98.11 DISCOID ATELECTASIS: ICD-10-CM

## 2024-05-30 DIAGNOSIS — I10 ESSENTIAL (PRIMARY) HYPERTENSION: ICD-10-CM

## 2024-05-30 DIAGNOSIS — E78.2 MIXED HYPERLIPIDEMIA: ICD-10-CM

## 2024-05-30 DIAGNOSIS — K76.0 HEPATIC STEATOSIS: Primary | ICD-10-CM

## 2024-05-30 DIAGNOSIS — Z09 HOSPITAL DISCHARGE FOLLOW-UP: ICD-10-CM

## 2024-05-30 RX ORDER — DICLOFENAC SODIUM 75 MG/1
TABLET, DELAYED RELEASE ORAL
COMMUNITY
Start: 2020-08-31

## 2024-05-30 RX ORDER — AMLODIPINE BESYLATE 10 MG/1
10 TABLET ORAL DAILY
Qty: 90 TABLET | Refills: 0 | Status: SHIPPED | OUTPATIENT
Start: 2024-05-30 | End: 2024-05-31 | Stop reason: ALTCHOICE

## 2024-05-30 NOTE — PATIENT INSTRUCTIONS
[facility]  Affiliated with [address]  [address] [phone]    Monitor blood pressure outside the office several times weekly at different times during the day and evening.   Blood Pressure Record     Patient Name:  _____Luciano Mcgowan Jr_________________ :  ________1965________    Date/Time BP Reading Pulse

## 2024-05-30 NOTE — TELEPHONE ENCOUNTER
[facility] received pt's US ORGAN ELASTOGRAPHY referral but are unable to schedule pt as they do not perform those types of procedures.    Please advise..

## 2024-05-30 NOTE — PROGRESS NOTES
[facility]      Chief Complaint:     Chief Complaint   Patient presents with    Blood Pressure Check    Follow-Up from Hospital     ED follow up [facility]       Luciano Mcgowan Jr is a 58 y.o. male that presents for: Follow up      Assessment/Plan:   I personally reviewed the following Pertinent Labs/Studies:   - Encounter notes, labs, and imaging from 5/28/24.    Pt is a 58 year old male with obesity, hepatic steatosis, HLD, and HTN presenting for follow up, as well as for follow up after being seen in the ED for weakness.    Luciano was seen today for blood pressure check and follow-up from hospital.    Diagnoses and all orders for this visit:    Hepatic steatosis: repeat fasting labs with normal LFTs. Patient has also significantly cut back on alcohol intake. Steatosis still has been confirmed on imaging so liver elastography has been ordered to assess for degree of fibrotic change.    Class 1 obesity due to excess calories without serious comorbidity with body mass index (BMI) of 30.0 to 30.9 in adult: discussed increased intake of whole vegetables.    Mixed hyperlipidemia: TG greatly improved on fasting labs, however still elevated at 201. LDL was 52 so ASCVD risk cannot be estimated. Statin not indicated.     Essential (primary) hypertension: BP not controlled but patient is taking a decongestant. States it was better when he wasn't taking the decongestant. Advised to stop and continue with Amlodipine 5. If not regularly getting pressures below 130/80 advised to start 10 mg. Will check urine microalbumin. If elevated will change CCB to an ARB.   -     Microalbumin / Creatinine Urine Ratio; Future  -     Discontinue: amLODIPine (NORVASC) 10 MG tablet; Take 1 tablet by mouth daily    Hospital discharge follow-up: seen for weakness. Flu, RSV, and COVID testing negative. Trop negative x2. U/A with protein otherwise unremarkable. UDS negative. CBC and BMP wnl. A CXR did show small linear scarring verus

## 2024-05-31 DIAGNOSIS — I10 ESSENTIAL (PRIMARY) HYPERTENSION: Primary | ICD-10-CM

## 2024-05-31 DIAGNOSIS — I10 ESSENTIAL (PRIMARY) HYPERTENSION: ICD-10-CM

## 2024-05-31 DIAGNOSIS — R80.9 MICROALBUMINURIA: ICD-10-CM

## 2024-05-31 PROBLEM — J98.11 DISCOID ATELECTASIS: Status: ACTIVE | Noted: 2024-05-31

## 2024-05-31 LAB
COMMENT:: NORMAL
CREAT UR-MCNC: 117 MG/DL
MICROALBUMIN UR-MCNC: 13.2 MG/DL
MICROALBUMIN/CREAT UR-RTO: 113 MG/G (ref 0–30)
SPECIMEN HOLD: NORMAL

## 2024-05-31 RX ORDER — OLMESARTAN MEDOXOMIL 20 MG/1
20 TABLET ORAL DAILY
Qty: 30 TABLET | Refills: 1 | Status: SHIPPED | OUTPATIENT
Start: 2024-05-31

## 2024-05-31 NOTE — PROGRESS NOTES
I saw and evaluated the patient, performing the key elements of the service on the date of service.  I discussed the findings, assessment and plan with the resident and agree with the resident's findings and plan as documented in the resident's note.     Priti Luu MD 5/31/2024

## 2024-06-03 ENCOUNTER — TELEPHONE (OUTPATIENT)
Facility: CLINIC | Age: 59
End: 2024-06-03

## 2024-06-03 NOTE — TELEPHONE ENCOUNTER
Tried to find  a location that was closer to pt for US Organ Electrography, those locations do not perform this procedure as there is a special machine needed. Was able to schedule pt appt with [facility] (Dr. Viktor Rice)on July 26. This appointment will be consult appt then they will schedule pt for US if needed.

## 2024-06-03 NOTE — TELEPHONE ENCOUNTER
Called pt to relay info, pt was unhappy with appointment date. Called [facility] ([facility]), they stated they may be able to get pt in this week for US. Referral faxed to [facility] ([facility]).

## 2024-06-13 ENCOUNTER — OFFICE VISIT (OUTPATIENT)
Facility: CLINIC | Age: 59
End: 2024-06-13
Payer: COMMERCIAL

## 2024-06-13 VITALS
HEART RATE: 86 BPM | SYSTOLIC BLOOD PRESSURE: 114 MMHG | TEMPERATURE: 97.8 F | BODY MASS INDEX: 34.81 KG/M2 | HEIGHT: 72 IN | RESPIRATION RATE: 16 BRPM | OXYGEN SATURATION: 98 % | DIASTOLIC BLOOD PRESSURE: 69 MMHG | WEIGHT: 257 LBS

## 2024-06-13 DIAGNOSIS — Z91.89 INTERMEDIATE RISK FOR CORONARY ARTERY DISEASE: ICD-10-CM

## 2024-06-13 DIAGNOSIS — K76.0 HEPATIC STEATOSIS: ICD-10-CM

## 2024-06-13 DIAGNOSIS — I10 ESSENTIAL (PRIMARY) HYPERTENSION: Primary | ICD-10-CM

## 2024-06-13 DIAGNOSIS — J98.11 DISCOID ATELECTASIS: ICD-10-CM

## 2024-06-13 PROCEDURE — 99213 OFFICE O/P EST LOW 20 MIN: CPT | Performed by: STUDENT IN AN ORGANIZED HEALTH CARE EDUCATION/TRAINING PROGRAM

## 2024-06-13 PROCEDURE — 3074F SYST BP LT 130 MM HG: CPT | Performed by: STUDENT IN AN ORGANIZED HEALTH CARE EDUCATION/TRAINING PROGRAM

## 2024-06-13 PROCEDURE — 3078F DIAST BP <80 MM HG: CPT | Performed by: STUDENT IN AN ORGANIZED HEALTH CARE EDUCATION/TRAINING PROGRAM

## 2024-06-13 RX ORDER — OLMESARTAN MEDOXOMIL 20 MG/1
30 TABLET ORAL DAILY
Qty: 135 TABLET | Refills: 0 | Status: SHIPPED | OUTPATIENT
Start: 2024-06-13

## 2024-06-13 ASSESSMENT — PATIENT HEALTH QUESTIONNAIRE - PHQ9
2. FEELING DOWN, DEPRESSED OR HOPELESS: SEVERAL DAYS
6. FEELING BAD ABOUT YOURSELF - OR THAT YOU ARE A FAILURE OR HAVE LET YOURSELF OR YOUR FAMILY DOWN: NOT AT ALL
SUM OF ALL RESPONSES TO PHQ QUESTIONS 1-9: 13
SUM OF ALL RESPONSES TO PHQ QUESTIONS 1-9: 13
4. FEELING TIRED OR HAVING LITTLE ENERGY: NEARLY EVERY DAY
5. POOR APPETITE OR OVEREATING: NEARLY EVERY DAY
3. TROUBLE FALLING OR STAYING ASLEEP: NEARLY EVERY DAY
SUM OF ALL RESPONSES TO PHQ9 QUESTIONS 1 & 2: 3
1. LITTLE INTEREST OR PLEASURE IN DOING THINGS: MORE THAN HALF THE DAYS
9. THOUGHTS THAT YOU WOULD BE BETTER OFF DEAD, OR OF HURTING YOURSELF: NOT AT ALL
SUM OF ALL RESPONSES TO PHQ QUESTIONS 1-9: 13
7. TROUBLE CONCENTRATING ON THINGS, SUCH AS READING THE NEWSPAPER OR WATCHING TELEVISION: SEVERAL DAYS
10. IF YOU CHECKED OFF ANY PROBLEMS, HOW DIFFICULT HAVE THESE PROBLEMS MADE IT FOR YOU TO DO YOUR WORK, TAKE CARE OF THINGS AT HOME, OR GET ALONG WITH OTHER PEOPLE: EXTREMELY DIFFICULT
SUM OF ALL RESPONSES TO PHQ QUESTIONS 1-9: 13
8. MOVING OR SPEAKING SO SLOWLY THAT OTHER PEOPLE COULD HAVE NOTICED. OR THE OPPOSITE, BEING SO FIGETY OR RESTLESS THAT YOU HAVE BEEN MOVING AROUND A LOT MORE THAN USUAL: NOT AT ALL

## 2024-06-13 NOTE — PROGRESS NOTES
[facility]      Chief Complaint:     Chief Complaint   Patient presents with    Follow-up Chronic Condition       Luciano Mcgowan Jr is a 58 y.o. male that presents for: BP check      Assessment/Plan:   I personally reviewed the following Pertinent Labs/Studies:   - Encounter notes, labs, and imaging from 5/30/24.    Pt is a 58 year old male with HTN, obesity, hepatic steatosis following up on hypertension.     Luciano was seen today for follow-up chronic condition.    Diagnoses and all orders for this visit:    Essential (primary) hypertension: blood pressure at goal, continue current regimen. Recheck urine microalbumin in 6 months. Check electrolytes and GFR at follow up.   -     olmesartan (BENICAR) 20 MG tablet; Take 1.5 tablets by mouth daily    Hepatic steatosis: liver elastography scan performed today. Will follow up.    Discoid atelectasis: repeat CXR in 4-6 weeks. If persistent consider CT scan.     Intermediate risk for coronary artery disease: 10-yr ASCVD risk 12.7%. Had stress test performed 9/2020 which was normal. Pt with non-anginal chest pain. Further risk stratify CAD with CAC scan. If higher risk could consider at that point repeating a stress test but no indication at this time.    -     CT CARDIAC CALCIUM SCORING; Future      Follow up:     Return in about 4 weeks (around 7/11/2024) for repeat chest XR (assess for resolution of atelectasis) .     Subjective:   HPI:  Luciano Mcgowan Jr is a 58 y.o. male that presents for:    Follow up on high blood pressure. On the 20 mg of ARB pressures were still upper 130's to 140's so started taking 1.5 tablets which he is tolerating well. Since starting medicine reports less semen production and wonders if this may be a side effect of the medicine. States he is still under stress at work and is worried it is damaging his heart. Is inquiring about a medical excuse for work and a test to look at his coronary arteries for blockages.    Health

## 2024-06-13 NOTE — PROGRESS NOTES
I reviewed with the resident the medical history and the resident's findings on the physical examination.  I discussed with the resident the patient's diagnosis and concur with the plan.     Priti Luu MD 6/13/2024

## 2024-06-27 ENCOUNTER — HOSPITAL ENCOUNTER (OUTPATIENT)
Facility: HOSPITAL | Age: 59
Discharge: HOME | End: 2024-06-27
Attending: STUDENT IN AN ORGANIZED HEALTH CARE EDUCATION/TRAINING PROGRAM

## 2024-06-27 DIAGNOSIS — Z91.89 INTERMEDIATE RISK FOR CORONARY ARTERY DISEASE: ICD-10-CM

## 2024-06-27 PROCEDURE — 75571 CT HRT W/O DYE W/CA TEST: CPT

## 2024-07-05 ENCOUNTER — TELEPHONE (OUTPATIENT)
Age: 59
End: 2024-07-05

## 2024-07-05 NOTE — TELEPHONE ENCOUNTER
Patient states he wants to see if he can get in as soon as possible due to the symptoms he is having and the test, he says that he is concerned.    Phone [phone]

## 2024-07-05 NOTE — TELEPHONE ENCOUNTER
Attempted to reach patient by telephone. A message was stating to go to ER if having symptoms. Has new patient appointment Wednesday.    Reviewed echo and recent Coronary Calcium Score.     Future Appointments   Date Time Provider Department Center   7/10/2024  3:40 PM Clyde Campoverde MD [facility]

## 2024-07-10 ENCOUNTER — OFFICE VISIT (OUTPATIENT)
Age: 59
End: 2024-07-10

## 2024-07-10 VITALS
SYSTOLIC BLOOD PRESSURE: 149 MMHG | HEART RATE: 94 BPM | BODY MASS INDEX: 34.54 KG/M2 | HEIGHT: 72 IN | WEIGHT: 255 LBS | TEMPERATURE: 98.1 F | OXYGEN SATURATION: 99 % | DIASTOLIC BLOOD PRESSURE: 90 MMHG | RESPIRATION RATE: 20 BRPM

## 2024-07-10 DIAGNOSIS — E78.2 MIXED HYPERLIPIDEMIA: ICD-10-CM

## 2024-07-10 DIAGNOSIS — I10 ESSENTIAL (PRIMARY) HYPERTENSION: Primary | ICD-10-CM

## 2024-07-10 DIAGNOSIS — I25.10 CORONARY ARTERIOSCLEROSIS DUE TO LIPID RICH PLAQUE: ICD-10-CM

## 2024-07-10 DIAGNOSIS — R07.2 PRECORDIAL CHEST PAIN: ICD-10-CM

## 2024-07-10 DIAGNOSIS — I25.83 CORONARY ARTERIOSCLEROSIS DUE TO LIPID RICH PLAQUE: ICD-10-CM

## 2024-07-10 DIAGNOSIS — I20.9 ANGINA PECTORIS (HCC): ICD-10-CM

## 2024-07-10 DIAGNOSIS — I10 ESSENTIAL (PRIMARY) HYPERTENSION: ICD-10-CM

## 2024-07-10 DIAGNOSIS — R93.1 ELEVATED CORONARY ARTERY CALCIUM SCORE: Primary | ICD-10-CM

## 2024-07-10 DIAGNOSIS — I49.3 PVC'S (PREMATURE VENTRICULAR CONTRACTIONS): ICD-10-CM

## 2024-07-10 NOTE — H&P (VIEW-ONLY)
Luciano Mcgowan Jr     1965       Clyde Campoverde MD, [facility]  Date of Visit-7/10/2024   PCP is Nahum Anderson MD   [facility] [place]  [facility]  HPI:  Luciano Mcgowan Jr is a 58 y.o. male   Resee seen for very high calcium score above 90th percentile.  Patient has history of hypertension seen by PCP 6/13/2024 calcium score recommended.  He has been on ARB.  CT calcium score 6/28/2024 showed a score of 573 in the LAD 73 in the circumflex 344 in the right coronary and 7 in the PDA with a total score of 1034 and a percentile of 90 percentile.  Patient an echocardiogram done at [facility] in [place] this showed EF 55 to 60% with mild mitral thickening mild MR no pericardial effusion.  Patient had an ETT done 9/24/2020  Patient previously seen 9/21/2020 episodes of racing heart had a  physical started on lisinopril at that time processes and 2012 with normal stress test Holter 2012 shows a few PVCs.  Goal for better a prior PTSD.  Previous triglycerides of 400  Today patient is here to see us he is anxious and sweaty from the diagnosis worried about his heart health.  He reports he has chest pain and spots that occur across the left precordium in the left lateral chest they have been increasing in frequency they occur every day.  Is also had increasing dyspnea minimal edema.  Has episodes of dizziness been working on blood pressure medicine changes.    Assessment/Plan:     Given letter for light duty for 2 weeks.  Office will contact him for cardiac cath.  Continue on current medications for statin and blood pressure can add aspirin.    The primary encounter diagnosis was Elevated coronary artery calcium score. Diagnoses of Coronary arteriosclerosis due to lipid rich plaque, Angina pectoris (HCC), Precordial chest pain, Essential (primary) hypertension, Mixed hyperlipidemia, and PVC's (premature ventricular contractions) were also pertinent to

## 2024-07-10 NOTE — PROGRESS NOTES
Reviewed record in preparation for visit and have necessary documentation  Pt did not bring medication to office visit for review  opportunity was given for questions  \"Have you been to the ER, urgent care clinic since your last visit?  Hospitalized since your last visit?\"    NO    “Have you seen or consulted any other health care providers outside of [facility] since your last visit?”    NO      Click Here for Release of Records Request     Goals that were addressed and/or need to be completed during or after this appointment include   Health Maintenance Due   Topic Date Due    Colorectal Cancer Screen  Never done    Polio vaccine (2 of 3 - Adult catch-up series) 08/07/2017    DTaP/Tdap/Td vaccine (3 - Td or Tdap) 11/03/2022    COVID-19 Vaccine (3 - 2023-24 season) 09/01/2023      
MG tablet, , Disp: , Rfl:     escitalopram (LEXAPRO) 10 MG tablet, Take by mouth daily, Disp: , Rfl:     traZODone (DESYREL) 100 MG tablet, Take by mouth nightly, Disp: , Rfl:    Impression see above.

## 2024-07-11 ENCOUNTER — TELEPHONE (OUTPATIENT)
Age: 59
End: 2024-07-11

## 2024-07-11 DIAGNOSIS — I10 ESSENTIAL (PRIMARY) HYPERTENSION: ICD-10-CM

## 2024-07-11 DIAGNOSIS — I25.10 CORONARY ARTERY DISEASE, UNSPECIFIED VESSEL OR LESION TYPE, UNSPECIFIED WHETHER ANGINA PRESENT, UNSPECIFIED WHETHER NATIVE OR TRANSPLANTED HEART: Primary | ICD-10-CM

## 2024-07-12 LAB
ANION GAP SERPL CALC-SCNC: 7 MMOL/L (ref 5–15)
BUN SERPL-MCNC: 16 MG/DL (ref 6–20)
BUN/CREAT SERPL: 16 (ref 12–20)
CALCIUM SERPL-MCNC: 9.1 MG/DL (ref 8.5–10.1)
CHLORIDE SERPL-SCNC: 108 MMOL/L (ref 97–108)
CO2 SERPL-SCNC: 24 MMOL/L (ref 21–32)
CREAT SERPL-MCNC: 0.98 MG/DL (ref 0.7–1.3)
ERYTHROCYTE [DISTWIDTH] IN BLOOD BY AUTOMATED COUNT: 13.2 % (ref 11.5–14.5)
GLUCOSE SERPL-MCNC: 150 MG/DL (ref 65–100)
HCT VFR BLD AUTO: 43 % (ref 36.6–50.3)
HGB BLD-MCNC: 14.4 G/DL (ref 12.1–17)
MCH RBC QN AUTO: 30.8 PG (ref 26–34)
MCHC RBC AUTO-ENTMCNC: 33.5 G/DL (ref 30–36.5)
MCV RBC AUTO: 91.9 FL (ref 80–99)
NRBC # BLD: 0 K/UL (ref 0–0.01)
NRBC BLD-RTO: 0 PER 100 WBC
PLATELET # BLD AUTO: 203 K/UL (ref 150–400)
PMV BLD AUTO: 9.4 FL (ref 8.9–12.9)
POTASSIUM SERPL-SCNC: 3.8 MMOL/L (ref 3.5–5.1)
RBC # BLD AUTO: 4.68 M/UL (ref 4.1–5.7)
SODIUM SERPL-SCNC: 139 MMOL/L (ref 136–145)
WBC # BLD AUTO: 4.8 K/UL (ref 4.1–11.1)

## 2024-07-15 ENCOUNTER — DIRECT ADMIT ORDERS (OUTPATIENT)
Facility: HOSPITAL | Age: 59
End: 2024-07-15

## 2024-07-15 RX ORDER — SODIUM CHLORIDE 9 MG/ML
INJECTION, SOLUTION INTRAVENOUS CONTINUOUS
Status: CANCELLED | OUTPATIENT
Start: 2024-07-15 | End: 2024-07-15

## 2024-07-15 RX ORDER — SODIUM CHLORIDE 0.9 % (FLUSH) 0.9 %
5-40 SYRINGE (ML) INJECTION EVERY 12 HOURS SCHEDULED
Status: CANCELLED | OUTPATIENT
Start: 2024-07-15

## 2024-07-15 RX ORDER — SODIUM CHLORIDE 0.9 % (FLUSH) 0.9 %
5-40 SYRINGE (ML) INJECTION PRN
Status: CANCELLED | OUTPATIENT
Start: 2024-07-15

## 2024-07-16 ENCOUNTER — HOSPITAL ENCOUNTER (OUTPATIENT)
Facility: HOSPITAL | Age: 59
Setting detail: OUTPATIENT SURGERY
Discharge: HOME | End: 2024-07-16
Attending: INTERNAL MEDICINE | Admitting: INTERNAL MEDICINE
Payer: COMMERCIAL

## 2024-07-16 VITALS
HEIGHT: 72 IN | DIASTOLIC BLOOD PRESSURE: 65 MMHG | BODY MASS INDEX: 33.61 KG/M2 | TEMPERATURE: 97.1 F | HEART RATE: 66 BPM | WEIGHT: 248.13 LBS | RESPIRATION RATE: 20 BRPM | SYSTOLIC BLOOD PRESSURE: 116 MMHG | OXYGEN SATURATION: 96 %

## 2024-07-16 DIAGNOSIS — I25.10 CAD (CORONARY ARTERY DISEASE): ICD-10-CM

## 2024-07-16 PROBLEM — R93.1 AGATSTON CORONARY ARTERY CALCIUM SCORE GREATER THAN 400: Status: ACTIVE | Noted: 2024-07-16

## 2024-07-16 LAB
ACT BLD: 269 SECS (ref 79–138)
ECHO BSA: 2.39 M2

## 2024-07-16 PROCEDURE — 6360000002 HC RX W HCPCS: Performed by: INTERNAL MEDICINE

## 2024-07-16 PROCEDURE — 85347 COAGULATION TIME ACTIVATED: CPT

## 2024-07-16 PROCEDURE — C1887 CATHETER, GUIDING: HCPCS | Performed by: INTERNAL MEDICINE

## 2024-07-16 PROCEDURE — 93458 L HRT ARTERY/VENTRICLE ANGIO: CPT | Performed by: INTERNAL MEDICINE

## 2024-07-16 PROCEDURE — 99153 MOD SED SAME PHYS/QHP EA: CPT | Performed by: INTERNAL MEDICINE

## 2024-07-16 PROCEDURE — 2500000003 HC RX 250 WO HCPCS: Performed by: INTERNAL MEDICINE

## 2024-07-16 PROCEDURE — C1769 GUIDE WIRE: HCPCS | Performed by: INTERNAL MEDICINE

## 2024-07-16 PROCEDURE — 85347 COAGULATION TIME ACTIVATED: CPT | Performed by: INTERNAL MEDICINE

## 2024-07-16 PROCEDURE — C1894 INTRO/SHEATH, NON-LASER: HCPCS | Performed by: INTERNAL MEDICINE

## 2024-07-16 PROCEDURE — 93571 IV DOP VEL&/PRESS C FLO 1ST: CPT | Performed by: INTERNAL MEDICINE

## 2024-07-16 PROCEDURE — 93572 IV DOP VEL&/PRESS C FLO EA: CPT | Performed by: INTERNAL MEDICINE

## 2024-07-16 PROCEDURE — 6370000000 HC RX 637 (ALT 250 FOR IP): Performed by: INTERNAL MEDICINE

## 2024-07-16 PROCEDURE — 6360000004 HC RX CONTRAST MEDICATION: Performed by: INTERNAL MEDICINE

## 2024-07-16 PROCEDURE — 99152 MOD SED SAME PHYS/QHP 5/>YRS: CPT | Performed by: INTERNAL MEDICINE

## 2024-07-16 PROCEDURE — 2709999900 HC NON-CHARGEABLE SUPPLY: Performed by: INTERNAL MEDICINE

## 2024-07-16 RX ORDER — SODIUM CHLORIDE 0.9 % (FLUSH) 0.9 %
5-40 SYRINGE (ML) INJECTION PRN
Status: DISCONTINUED | OUTPATIENT
Start: 2024-07-16 | End: 2024-07-16 | Stop reason: HOSPADM

## 2024-07-16 RX ORDER — VERAPAMIL HYDROCHLORIDE 2.5 MG/ML
INJECTION, SOLUTION INTRAVENOUS PRN
Status: DISCONTINUED | OUTPATIENT
Start: 2024-07-16 | End: 2024-07-16 | Stop reason: HOSPADM

## 2024-07-16 RX ORDER — SODIUM CHLORIDE 9 MG/ML
INJECTION, SOLUTION INTRAVENOUS CONTINUOUS
Status: CANCELLED | OUTPATIENT
Start: 2024-07-16 | End: 2024-07-16

## 2024-07-16 RX ORDER — ACETAMINOPHEN 325 MG/1
650 TABLET ORAL EVERY 4 HOURS PRN
Status: DISCONTINUED | OUTPATIENT
Start: 2024-07-16 | End: 2024-07-16 | Stop reason: HOSPADM

## 2024-07-16 RX ORDER — MIDAZOLAM HYDROCHLORIDE 1 MG/ML
INJECTION INTRAMUSCULAR; INTRAVENOUS PRN
Status: DISCONTINUED | OUTPATIENT
Start: 2024-07-16 | End: 2024-07-16 | Stop reason: HOSPADM

## 2024-07-16 RX ORDER — HEPARIN SODIUM 1000 [USP'U]/ML
INJECTION, SOLUTION INTRAVENOUS; SUBCUTANEOUS PRN
Status: DISCONTINUED | OUTPATIENT
Start: 2024-07-16 | End: 2024-07-16 | Stop reason: HOSPADM

## 2024-07-16 RX ORDER — FENTANYL CITRATE 50 UG/ML
INJECTION, SOLUTION INTRAMUSCULAR; INTRAVENOUS PRN
Status: DISCONTINUED | OUTPATIENT
Start: 2024-07-16 | End: 2024-07-16 | Stop reason: HOSPADM

## 2024-07-16 RX ORDER — ASPIRIN 81 MG/1
81 TABLET ORAL DAILY
Qty: 90 TABLET | Refills: 3 | Status: SHIPPED | OUTPATIENT
Start: 2024-07-16

## 2024-07-16 RX ORDER — SODIUM CHLORIDE 9 MG/ML
INJECTION, SOLUTION INTRAVENOUS CONTINUOUS
Status: DISPENSED | OUTPATIENT
Start: 2024-07-16 | End: 2024-07-16

## 2024-07-16 RX ORDER — SODIUM CHLORIDE 0.9 % (FLUSH) 0.9 %
5-40 SYRINGE (ML) INJECTION EVERY 12 HOURS SCHEDULED
Status: DISCONTINUED | OUTPATIENT
Start: 2024-07-16 | End: 2024-07-16 | Stop reason: HOSPADM

## 2024-07-16 RX ORDER — HEPARIN SODIUM 200 [USP'U]/100ML
INJECTION, SOLUTION INTRAVENOUS PRN
Status: DISCONTINUED | OUTPATIENT
Start: 2024-07-16 | End: 2024-07-16 | Stop reason: HOSPADM

## 2024-07-16 RX ADMIN — ACETAMINOPHEN 650 MG: 325 TABLET ORAL at 15:28

## 2024-07-16 ASSESSMENT — PAIN - FUNCTIONAL ASSESSMENT: PAIN_FUNCTIONAL_ASSESSMENT: ACTIVITIES ARE NOT PREVENTED

## 2024-07-16 ASSESSMENT — PAIN DESCRIPTION - DESCRIPTORS: DESCRIPTORS: ACHING

## 2024-07-16 ASSESSMENT — PAIN DESCRIPTION - LOCATION: LOCATION: HEAD

## 2024-07-16 ASSESSMENT — PAIN SCALES - GENERAL: PAINLEVEL_OUTOF10: 5

## 2024-07-16 ASSESSMENT — PAIN DESCRIPTION - ORIENTATION: ORIENTATION: ANTERIOR

## 2024-07-16 NOTE — PROCEDURES
BRIEF PROCEDURE NOTE    Date of Procedure: 7/16/2024   Preoperative Diagnosis: Chest Pain  Postoperative Diagnosis: Non obstructive CAD - LAD/Lcflex - iFR neg; Distal RCA at bifurcation into PDA/PLB - 50%    Procedure: Left heart cath, LV angiography, coronary angiography  Interventional Cardiologist: Calderon Estrada MD  Assistant : none  Anesthesia: local + IV sedation  I administered moderate sedation throughout this procedure. An independent trained observer pushed medications at my direction, and monitored the patient’s level of consciousness and physiological status throughout.  Estimated Blood Loss: Minimal    Access: R Radial Access - 6 F sheath          Catheters: RCA : JR4                    LCA : JL4    Findings:     L Main:  Large- Long Lmain; MLI    LAD: Med;  Mid tandem 40-50%; D1 - small to med; prox 30%     LCflex: Small to med; Mid 50% High OM1 ( RI) - Prox 30%; OM2 - small; MLI    RCA: Dominant; Prox /Mid - large; Distal - Med; Distal vessel - prox seg 40%; distal segment at bifurcation 50% ; PDA - small to med; ostial 50%; PLB - small ; ostial 50%    LVEDP: 5 mm Hg    LVEF: Not assessed    No significant gradient across aortic valve.    PCI: None   EBU 3.5 guide  Mid LAD 50% ; iFR 0 94 and 0.91    Mid Lcflex 60% - iFR 0.95    Specimens Removed : None    Devices implanted : None    Complications: None    Closure Device: R Radial - TR band        See full cath note.    Complications: none    Calderon Estrada MD

## 2024-07-16 NOTE — PROGRESS NOTES
10:42 AM  Patient arrived. ID and allergies verified verbally with patient. Pt voices understanding of procedure to be performed. Consent obtained. Pt prepped for procedure. Pt denies contrast allergy. Patient denies taking any blood thinners.

## 2024-07-16 NOTE — INTERVAL H&P NOTE
Update History & Physical    The patient's History and Physical of  7/10/24,  was reviewed with the patient and I examined the patient. There was no change.     Plan: The risks, benefits, expected outcome, and alternative to the recommended procedure have been discussed with the patient. Patient understands and wants to proceed with the procedure.     Electronically signed by Calderon Estrada MD on 7/16/2024 at 10:52 AM

## 2024-07-16 NOTE — PROGRESS NOTES
1:43 PM  TRANSFER - IN REPORT:    Verbal report received from Yvonne Mcgowan Jr  being received from Cath Lab for routine post-op      Report consisted of patient's Situation, Background, Assessment and   Recommendations(SBAR).     Information from the following report(s) Nurse Handoff Report was reviewed with the receiving nurse.    Opportunity for questions and clarification was provided.      Assessment completed upon patient's arrival to unit and care assumed.      3:00 PM  Discharge instructions reviewed with patient and family. Voiced understanding. Patient given copy of discharge instructions to take home.          3:47 PM  Patient ambulates on unit.     4:05 PM  Pt discharged via wheelchair with friend, Dick. Personal belongings with patient upon discharge.

## 2024-07-18 ENCOUNTER — TELEPHONE (OUTPATIENT)
Age: 59
End: 2024-07-18

## 2024-07-18 DIAGNOSIS — E78.2 MIXED HYPERLIPIDEMIA: ICD-10-CM

## 2024-07-18 DIAGNOSIS — K76.0 HEPATIC STEATOSIS: ICD-10-CM

## 2024-07-18 DIAGNOSIS — I25.10 CAD (CORONARY ARTERY DISEASE): Primary | ICD-10-CM

## 2024-07-18 NOTE — TELEPHONE ENCOUNTER
Patient requests a call from Dr Campoverde to discuss recent procedure. Follow up scheduled for  08/14.     [phone].    Patient requests a call from Dr Campoverde.

## 2024-07-18 NOTE — TELEPHONE ENCOUNTER
Patient requests a call from Dr Campoverde to discuss recent procedure. Follow up scheduled for  08/14.     [phone].

## 2024-07-19 ENCOUNTER — TELEMEDICINE (OUTPATIENT)
Age: 59
End: 2024-07-19
Payer: COMMERCIAL

## 2024-07-19 DIAGNOSIS — E78.2 MIXED HYPERLIPIDEMIA: ICD-10-CM

## 2024-07-19 DIAGNOSIS — R07.82 INTERCOSTAL PAIN: ICD-10-CM

## 2024-07-19 DIAGNOSIS — I10 ESSENTIAL (PRIMARY) HYPERTENSION: ICD-10-CM

## 2024-07-19 DIAGNOSIS — I25.118 CORONARY ARTERY DISEASE INVOLVING NATIVE CORONARY ARTERY OF NATIVE HEART WITH OTHER FORM OF ANGINA PECTORIS (HCC): Primary | ICD-10-CM

## 2024-07-19 DIAGNOSIS — I49.3 PVC'S (PREMATURE VENTRICULAR CONTRACTIONS): ICD-10-CM

## 2024-07-19 DIAGNOSIS — R93.1 AGATSTON CORONARY ARTERY CALCIUM SCORE GREATER THAN 400: ICD-10-CM

## 2024-07-19 PROCEDURE — 99442 PR PHYS/QHP TELEPHONE EVALUATION 11-20 MIN: CPT | Performed by: SPECIALIST

## 2024-07-19 RX ORDER — ROSUVASTATIN CALCIUM 20 MG/1
20 TABLET, COATED ORAL NIGHTLY
Qty: 90 TABLET | Refills: 3 | Status: SHIPPED | OUTPATIENT
Start: 2024-07-19

## 2024-07-19 NOTE — TELEPHONE ENCOUNTER
Verified patient with two types of identifiers. Spoke to Mr. Mcgowan at length. He has several concerns. He is worried about the blockages seen on the cath, the use of Cialis and what his prognosis is going forward. Let him know Dr. Campoverde will address all questions today with a telephone call at 1100.     Future Appointments   Date Time Provider Department Center   7/19/2024 11:00 AM Clyde Campoverde MD [facility]   8/14/2024  2:40 PM Clyde Campoverde MD [facility]

## 2024-07-19 NOTE — PROGRESS NOTES
TELEPHONE VISIT DOCUMENTATION      He and/or health care decision maker is aware that that he may receive a bill for this telephone service, depending on his insurance coverage, and has provided verbal consent to proceed.  This is a Patient Initiated Episode with an Established Patient who has not had a related appointment within my department in the past 7 days or scheduled within the next 24 hours.     ASSESSMENT      1. Coronary artery disease involving native coronary artery of native heart with other form of angina pectoris (HCC)  Cardiac cath 7/15/2024 Dr. Estrada left main was large and long with MLI.  LAD mid tandem 40-50% lesion.  First diagonal small to medium with proximal 30% lesion.  Left circumflex small to medium with a mid 50% high OM1 proximal 30%.  RCA was dominant with a distal vessel 40% and bifurcation 50% lesion.  Right-sided PDA showed an ostial 50% lesion.  LVEDP was 5.  There is no gradient across the valve.  Mid LAD had FFR IFR done at 0.9 4.91 circumflex IFR 0.95 based on this no intervention was done.    Patient is here to review he has had intermittent episodes of intercostal and chest pain.  Catheterization reveals distal disease without significant occlusion.  He will continue on aggressive medical therapy.  Reviewed findings with patient.  Very anxious gentleman in the  has had some service-connected issues.  Has prior PTSD.  He made it conversations we go over the cath results and reassured him that he is stable at this time which was the main point of today's visit.  2. PVC's (premature ventricular contractions)  Echo at [facility] in [place] showed EF 55 to 60% mild mitral valve thickening mild MR.  He sees previously noted PVCs on prior stress test in 2020 and Holter 2012.  3. Intercostal pain  Suspect much of this pain is intercostal and musculoskeletal.  We have given him a previous letter for light duty he can resume now to activity but is now under

## 2024-07-19 NOTE — TELEPHONE ENCOUNTER
Clyde Campoverde MD  You43 minutes ago (3:11 PM)       Increase Crestor to 20 mg  See me in 3 months with FLP Lp(A) just prior  Can cancel 8/14 appt    Future Appointments  8/14/2024  2:40 PM    Clyde Campoverde MD [facility]   Per VO by MD.    Future Appointments   Date Time Provider Department Center   10/9/2024 11:20 AM Clyde Campoverde MD [facility]

## 2024-07-25 ENCOUNTER — TELEPHONE (OUTPATIENT)
Age: 59
End: 2024-07-25

## 2024-09-30 ENCOUNTER — OFFICE VISIT (OUTPATIENT)
Facility: CLINIC | Age: 59
End: 2024-09-30

## 2024-09-30 VITALS
OXYGEN SATURATION: 97 % | RESPIRATION RATE: 17 BRPM | DIASTOLIC BLOOD PRESSURE: 68 MMHG | SYSTOLIC BLOOD PRESSURE: 110 MMHG | HEIGHT: 72 IN | BODY MASS INDEX: 32.91 KG/M2 | TEMPERATURE: 98.7 F | HEART RATE: 65 BPM | WEIGHT: 243 LBS

## 2024-09-30 DIAGNOSIS — I25.118 CORONARY ARTERY DISEASE INVOLVING NATIVE CORONARY ARTERY OF NATIVE HEART WITH OTHER FORM OF ANGINA PECTORIS (HCC): ICD-10-CM

## 2024-09-30 DIAGNOSIS — E78.2 MIXED HYPERLIPIDEMIA: ICD-10-CM

## 2024-09-30 DIAGNOSIS — I25.10 CAD (CORONARY ARTERY DISEASE): ICD-10-CM

## 2024-09-30 DIAGNOSIS — I10 ESSENTIAL (PRIMARY) HYPERTENSION: ICD-10-CM

## 2024-09-30 RX ORDER — OLMESARTAN MEDOXOMIL 20 MG/1
30 TABLET ORAL DAILY
Qty: 135 TABLET | Refills: 1 | Status: SHIPPED | OUTPATIENT
Start: 2024-09-30

## 2024-09-30 NOTE — PROGRESS NOTES
Chief Complaint   Patient presents with    Follow-up Chronic Condition         \"Have you been to the ER, urgent care clinic since your last visit?  Hospitalized since your last visit?\"    NO    “Have you seen or consulted any other health care providers outside of [facility] since your last visit?”    NO        “Have you had a colorectal cancer screening such as a colonoscopy/FIT/Cologuard?    YES- [facility] 2-3 years ago.      Click Here for Release of Records Request     Health Maintenance Due   Topic Date Due    Colorectal Cancer Screen  Never done    Polio vaccine (2 of 3 - Adult catch-up series) 08/07/2017    DTaP/Tdap/Td vaccine (3 - Td or Tdap) 11/03/2022    COVID-19 Vaccine (3 - 2023-24 season) 09/01/2024

## 2024-09-30 NOTE — PROGRESS NOTES
History of Present Illness:    Luciano Mcgowan Jr is a 59 y.o. male who presents for BP follow up and med refill.      Past Medical History:   Diagnosis Date    Calculus of kidney     Chest pain, unspecified 6/17/2012    Hypertension     PVC's (premature ventricular contractions) 6/17/2012       Past Surgical History:   Procedure Laterality Date    CARDIAC PROCEDURE N/A 7/16/2024    Left heart cath / coronary angiography performed by Calderon Estrada MD at [facility] CARDIAC CATH LAB    CARDIAC PROCEDURE N/A 7/16/2024    Instantaneous wave-free ratio (iFR) performed by Calderon Estrada MD at [facility] CARDIAC CATH LAB    INVASIVE VASCULAR N/A 7/16/2024    Ultrasound guided vascular access performed by Calderon Estrada MD at [facility] CARDIAC CATH LAB    ORTHOPEDIC SURGERY         Family History   Problem Relation Age of Onset    Heart Attack Brother     Pacemaker Brother        Current Outpatient Medications   Medication Sig Dispense Refill    empagliflozin (JARDIANCE) 25 MG tablet Take 0.5 tablets by mouth daily      olmesartan (BENICAR) 20 MG tablet Take 1.5 tablets by mouth daily 135 tablet 1    aspirin 81 MG EC tablet Take 1 tablet by mouth daily 90 tablet 3    diclofenac (VOLTAREN) 75 MG EC tablet       vitamin D (CHOLECALCIFEROL) 50 MCG (2000 UT) TABS tablet       fexofenadine (ALLEGRA) 180 MG tablet       fluticasone (FLONASE) 50 MCG/ACT nasal spray       tadalafil (CIALIS) 20 MG tablet       escitalopram (LEXAPRO) 10 MG tablet Take by mouth daily      traZODone (DESYREL) 100 MG tablet Take by mouth nightly      rosuvastatin (CRESTOR) 20 MG tablet Take 1 tablet by mouth nightly 90 tablet 3     No current facility-administered medications for this visit.       Allergies   Allergen Reactions    Doxycycline Hives    Sildenafil Headaches       Social History     Tobacco Use    Smoking status: Never    Smokeless tobacco: Current   Substance Use Topics    Alcohol use: Yes     Alcohol/week: 6.0 standard drinks of

## 2024-10-01 LAB
ALBUMIN SERPL-MCNC: 4.3 G/DL (ref 3.5–5)
ALBUMIN/GLOB SERPL: 1.4 (ref 1.1–2.2)
ALP SERPL-CCNC: 57 U/L (ref 45–117)
ALT SERPL-CCNC: 41 U/L (ref 12–78)
AST SERPL-CCNC: 26 U/L (ref 15–37)
BILIRUB DIRECT SERPL-MCNC: 0.1 MG/DL (ref 0–0.2)
BILIRUB SERPL-MCNC: 0.5 MG/DL (ref 0.2–1)
CHOLEST SERPL-MCNC: 122 MG/DL
GLOBULIN SER CALC-MCNC: 3.1 G/DL (ref 2–4)
HDLC SERPL-MCNC: 36 MG/DL
HDLC SERPL: 3.4 (ref 0–5)
LDLC SERPL CALC-MCNC: 39 MG/DL (ref 0–100)
PROT SERPL-MCNC: 7.4 G/DL (ref 6.4–8.2)
TRIGL SERPL-MCNC: 235 MG/DL
VLDLC SERPL CALC-MCNC: 47 MG/DL

## 2024-10-01 RX ORDER — ROSUVASTATIN CALCIUM 20 MG/1
20 TABLET, COATED ORAL NIGHTLY
Qty: 90 TABLET | Refills: 3 | Status: SHIPPED | OUTPATIENT
Start: 2024-10-01

## 2024-10-02 LAB — LPA SERPL-SCNC: 67.2 NMOL/L

## 2024-10-09 ENCOUNTER — OFFICE VISIT (OUTPATIENT)
Age: 59
End: 2024-10-09

## 2024-10-09 VITALS
HEIGHT: 72 IN | HEART RATE: 77 BPM | TEMPERATURE: 98.4 F | SYSTOLIC BLOOD PRESSURE: 106 MMHG | DIASTOLIC BLOOD PRESSURE: 67 MMHG | OXYGEN SATURATION: 96 % | WEIGHT: 242 LBS | RESPIRATION RATE: 16 BRPM | BODY MASS INDEX: 32.78 KG/M2

## 2024-10-09 DIAGNOSIS — R07.82 INTERCOSTAL PAIN: ICD-10-CM

## 2024-10-09 DIAGNOSIS — I25.118 CORONARY ARTERY DISEASE INVOLVING NATIVE CORONARY ARTERY OF NATIVE HEART WITH OTHER FORM OF ANGINA PECTORIS (HCC): Primary | ICD-10-CM

## 2024-10-09 DIAGNOSIS — R93.1 AGATSTON CORONARY ARTERY CALCIUM SCORE GREATER THAN 400: ICD-10-CM

## 2024-10-09 DIAGNOSIS — E78.2 MIXED HYPERLIPIDEMIA: ICD-10-CM

## 2024-10-09 DIAGNOSIS — I49.3 PVC'S (PREMATURE VENTRICULAR CONTRACTIONS): ICD-10-CM

## 2024-10-09 DIAGNOSIS — I10 ESSENTIAL (PRIMARY) HYPERTENSION: ICD-10-CM

## 2024-10-09 NOTE — PROGRESS NOTES
Luciano Mcgowan Jr     1965       Clyde Campoverde MD, [facility]  Date of Visit-10/9/2024   PCP is Nahum Anderson MD   [facility] [place]  [facility]  HPI:  Luciano Mcgowan Jr is a 59 y.o. male   3-month follow-up  Mild to moderate CAD cath 7/15/2024, calcium score 1034 90th percentile 6/8/2024, normal EF    Today,  He returns feeling much better.  He had a lot of anxiety over his cardiac health and his chest pain.  He underwent heart catheterization which showed mild to moderate plaque despite high calcium score.  He is can be treated medically and is feeling better.  He is back to his previous activities without chest pain chest pressure.  The pain he described now seems more like a spot like pain intermittently related perhaps musculoskeletal.  He is decreasing his workload.  Denies chest pain, edema, syncope or shortness of breath at rest   Has no tachycardia , palpitations or sense of arrythmia    I have personally visualized the cath films feel that the lesions are truly less than 50% or 50% at worst.      Cath 7/15/24- LAD mid 40-50%, Circ mid 50, RCA 50, RPDA 50  CT calcium score 6/28/2024 showed total score of 1034 and a percentile of 90 percentile/ score of 573 in the LAD ,73 in the circumflex, 344 in the right coronary and 7 in the PDA with a   Echo- [facility] in [place] =EF 55 to 60% with mild mitral thickening mild MR no pericardial effusion.  ETT done 9/24/2020 9/21/2020 episodes of racing heart had a  physical started on lisinopril at that time processes and 2012 with normal stress test Holter 2012 shows a few PVCs.  prior PTSD.  Previous triglycerides of 400    Assessment/Plan:     Patient Instructions   We will see you back for an annual follow up.         1. Coronary artery disease involving native coronary artery of native heart with other form of angina pectoris (HCC)  Cardiac cath 7/15/2024 Dr. Estrada left main was large

## 2024-10-09 NOTE — PROGRESS NOTES
Reviewed record in preparation for visit and have necessary documentation  Pt did not bring medication to office visit for review  opportunity was given for questions  \"Have you been to the ER, urgent care clinic since your last visit?  Hospitalized since your last visit?\"    NO    “Have you seen or consulted any other health care providers outside of [facility] since your last visit?”    NO      Click Here for Release of Records Request     Goals that were addressed and/or need to be completed during or after this appointment include   Health Maintenance Due   Topic Date Due    Colorectal Cancer Screen  Never done    Polio vaccine (2 of 3 - Adult catch-up series) 08/07/2017    DTaP/Tdap/Td vaccine (3 - Td or Tdap) 11/03/2022    COVID-19 Vaccine (3 - 2023-24 season) 09/01/2024

## 2025-04-05 DIAGNOSIS — I10 ESSENTIAL (PRIMARY) HYPERTENSION: ICD-10-CM

## 2025-04-07 RX ORDER — OLMESARTAN MEDOXOMIL 20 MG/1
30 TABLET ORAL DAILY
Qty: 135 TABLET | Refills: 1 | Status: SHIPPED | OUTPATIENT
Start: 2025-04-07

## (undated) DEVICE — GLIDESHEATH SLENDER STAINLESS STEEL KIT: Brand: GLIDESHEATH SLENDER

## (undated) DEVICE — CATHETER GUID 6FR L100CM DIA0.071IN NYL SHFT EBU3.5

## (undated) DEVICE — HEART CATH-SFMC: Brand: MEDLINE INDUSTRIES, INC.

## (undated) DEVICE — VALVE IV REFLX W/ M/F LUER LCK UNIV CAP STRL DISP

## (undated) DEVICE — SUPPORT WRST AD W3.5XL9IN DIA14.5IN ART SFT ADJ HK AND LOOP

## (undated) DEVICE — CATHETER DIAG 5FR L100CM LUMN ID0.047IN JL3.5 CRV 0 SIDE H

## (undated) DEVICE — CATHETER 5FR AL1 MEDTRONIC 100CM

## (undated) DEVICE — CONNECTOR FLD DISPNS FOR FILL UD SYRINGES

## (undated) DEVICE — TUBING PRSS MON L6IN PVC M FEM CONN

## (undated) DEVICE — MEDI-TRACE CADENCE ADULT, DEFIBRILLATION ELECTRODE -RTS  (10 PR/PK) - PHYSIO-CONTROL: Brand: MEDI-TRACE CADENCE

## (undated) DEVICE — CATHETER KIT JL4 JR4 5FR 100CM 145 PGTL DXTERITY

## (undated) DEVICE — Device

## (undated) DEVICE — KENDALL DL ECG DUAL CONNECT RADIOLUCENT LEAD WIRES, 5-LEAD, SINGLE PATIENT USE: Brand: KENDALL

## (undated) DEVICE — BAND COMPR L24CM REG CLR PLAS HEMSTAT EXT HK AND LOOP RETEN

## (undated) DEVICE — TOOL INSRT 0022IN S STL GWIRE

## (undated) DEVICE — COPILOT BLEEDBACK CONTROL VALVE: Brand: COPILOT

## (undated) DEVICE — Device: Brand: OMNIWIRE PRESSURE GUIDE WIRE